# Patient Record
Sex: MALE | Race: WHITE | NOT HISPANIC OR LATINO | Employment: FULL TIME | URBAN - METROPOLITAN AREA
[De-identification: names, ages, dates, MRNs, and addresses within clinical notes are randomized per-mention and may not be internally consistent; named-entity substitution may affect disease eponyms.]

---

## 2017-06-06 PROBLEM — E11.65 UNCONTROLLED TYPE 2 DIABETES MELLITUS WITH HYPERGLYCEMIA, WITH LONG-TERM CURRENT USE OF INSULIN: Status: ACTIVE | Noted: 2017-06-06

## 2017-06-06 PROBLEM — Z79.4 UNCONTROLLED TYPE 2 DIABETES MELLITUS WITH HYPERGLYCEMIA, WITH LONG-TERM CURRENT USE OF INSULIN: Status: ACTIVE | Noted: 2017-06-06

## 2017-09-06 PROBLEM — Z79.4 UNCONTROLLED TYPE 2 DIABETES MELLITUS WITH HYPERGLYCEMIA, WITH LONG-TERM CURRENT USE OF INSULIN: Status: RESOLVED | Noted: 2017-06-06 | Resolved: 2017-09-06

## 2017-09-06 PROBLEM — E11.9 DIABETES MELLITUS WITHOUT COMPLICATION: Status: ACTIVE | Noted: 2017-09-06

## 2017-09-06 PROBLEM — E11.65 UNCONTROLLED TYPE 2 DIABETES MELLITUS WITH HYPERGLYCEMIA, WITH LONG-TERM CURRENT USE OF INSULIN: Status: RESOLVED | Noted: 2017-06-06 | Resolved: 2017-09-06

## 2017-11-28 PROBLEM — M19.012 OSTEOARTHRITIS OF LEFT SHOULDER: Status: ACTIVE | Noted: 2017-11-28

## 2018-09-28 PROBLEM — E11.9 DIABETES MELLITUS WITHOUT COMPLICATION: Status: RESOLVED | Noted: 2017-09-06 | Resolved: 2018-09-28

## 2019-11-19 PROBLEM — F51.01 PRIMARY INSOMNIA: Status: ACTIVE | Noted: 2019-11-19

## 2019-11-19 PROBLEM — I10 ESSENTIAL HYPERTENSION: Status: ACTIVE | Noted: 2019-11-19

## 2019-11-19 PROBLEM — E66.811 CLASS 1 OBESITY DUE TO EXCESS CALORIES WITH SERIOUS COMORBIDITY AND BODY MASS INDEX (BMI) OF 32.0 TO 32.9 IN ADULT: Status: ACTIVE | Noted: 2019-11-19

## 2019-11-19 PROBLEM — R76.8 HEPATITIS C ANTIBODY TEST POSITIVE: Status: ACTIVE | Noted: 2019-11-19

## 2019-11-19 PROBLEM — E66.09 CLASS 1 OBESITY DUE TO EXCESS CALORIES WITH SERIOUS COMORBIDITY AND BODY MASS INDEX (BMI) OF 32.0 TO 32.9 IN ADULT: Status: ACTIVE | Noted: 2019-11-19

## 2020-01-27 PROBLEM — J45.41 MODERATE PERSISTENT ASTHMA WITH ACUTE EXACERBATION: Status: ACTIVE | Noted: 2020-01-27

## 2022-12-14 PROBLEM — M54.12 CERVICAL RADICULOPATHY: Status: ACTIVE | Noted: 2022-12-14

## 2022-12-14 PROBLEM — E78.2 MIXED HYPERLIPIDEMIA: Status: ACTIVE | Noted: 2022-12-14

## 2022-12-21 ENCOUNTER — OFFICE VISIT (OUTPATIENT)
Dept: PAIN MEDICINE | Facility: CLINIC | Age: 67
End: 2022-12-21
Payer: COMMERCIAL

## 2022-12-21 VITALS
HEIGHT: 74 IN | HEART RATE: 87 BPM | BODY MASS INDEX: 32.36 KG/M2 | WEIGHT: 252.19 LBS | SYSTOLIC BLOOD PRESSURE: 158 MMHG | DIASTOLIC BLOOD PRESSURE: 86 MMHG

## 2022-12-21 DIAGNOSIS — M54.12 CERVICAL RADICULOPATHY: ICD-10-CM

## 2022-12-21 DIAGNOSIS — M54.2 CERVICALGIA: Primary | ICD-10-CM

## 2022-12-21 PROCEDURE — 99999 PR PBB SHADOW E&M-EST. PATIENT-LVL V: CPT | Mod: PBBFAC,,, | Performed by: PHYSICIAN ASSISTANT

## 2022-12-21 PROCEDURE — 1125F AMNT PAIN NOTED PAIN PRSNT: CPT | Mod: CPTII,S$GLB,, | Performed by: PHYSICIAN ASSISTANT

## 2022-12-21 PROCEDURE — 1101F PR PT FALLS ASSESS DOC 0-1 FALLS W/OUT INJ PAST YR: ICD-10-PCS | Mod: CPTII,S$GLB,, | Performed by: PHYSICIAN ASSISTANT

## 2022-12-21 PROCEDURE — 99205 OFFICE O/P NEW HI 60 MIN: CPT | Mod: S$GLB,,, | Performed by: PHYSICIAN ASSISTANT

## 2022-12-21 PROCEDURE — 3066F NEPHROPATHY DOC TX: CPT | Mod: CPTII,S$GLB,, | Performed by: PHYSICIAN ASSISTANT

## 2022-12-21 PROCEDURE — 1160F RVW MEDS BY RX/DR IN RCRD: CPT | Mod: CPTII,S$GLB,, | Performed by: PHYSICIAN ASSISTANT

## 2022-12-21 PROCEDURE — 3288F PR FALLS RISK ASSESSMENT DOCUMENTED: ICD-10-PCS | Mod: CPTII,S$GLB,, | Performed by: PHYSICIAN ASSISTANT

## 2022-12-21 PROCEDURE — 3008F BODY MASS INDEX DOCD: CPT | Mod: CPTII,S$GLB,, | Performed by: PHYSICIAN ASSISTANT

## 2022-12-21 PROCEDURE — 1101F PT FALLS ASSESS-DOCD LE1/YR: CPT | Mod: CPTII,S$GLB,, | Performed by: PHYSICIAN ASSISTANT

## 2022-12-21 PROCEDURE — 1159F PR MEDICATION LIST DOCUMENTED IN MEDICAL RECORD: ICD-10-PCS | Mod: CPTII,S$GLB,, | Performed by: PHYSICIAN ASSISTANT

## 2022-12-21 PROCEDURE — 1159F MED LIST DOCD IN RCRD: CPT | Mod: CPTII,S$GLB,, | Performed by: PHYSICIAN ASSISTANT

## 2022-12-21 PROCEDURE — 1125F PR PAIN SEVERITY QUANTIFIED, PAIN PRESENT: ICD-10-PCS | Mod: CPTII,S$GLB,, | Performed by: PHYSICIAN ASSISTANT

## 2022-12-21 PROCEDURE — 3046F HEMOGLOBIN A1C LEVEL >9.0%: CPT | Mod: CPTII,S$GLB,, | Performed by: PHYSICIAN ASSISTANT

## 2022-12-21 PROCEDURE — 3288F FALL RISK ASSESSMENT DOCD: CPT | Mod: CPTII,S$GLB,, | Performed by: PHYSICIAN ASSISTANT

## 2022-12-21 PROCEDURE — 3079F PR MOST RECENT DIASTOLIC BLOOD PRESSURE 80-89 MM HG: ICD-10-PCS | Mod: CPTII,S$GLB,, | Performed by: PHYSICIAN ASSISTANT

## 2022-12-21 PROCEDURE — 4010F ACE/ARB THERAPY RXD/TAKEN: CPT | Mod: CPTII,S$GLB,, | Performed by: PHYSICIAN ASSISTANT

## 2022-12-21 PROCEDURE — 99205 PR OFFICE/OUTPT VISIT, NEW, LEVL V, 60-74 MIN: ICD-10-PCS | Mod: S$GLB,,, | Performed by: PHYSICIAN ASSISTANT

## 2022-12-21 PROCEDURE — 1160F PR REVIEW ALL MEDS BY PRESCRIBER/CLIN PHARMACIST DOCUMENTED: ICD-10-PCS | Mod: CPTII,S$GLB,, | Performed by: PHYSICIAN ASSISTANT

## 2022-12-21 PROCEDURE — 3077F PR MOST RECENT SYSTOLIC BLOOD PRESSURE >= 140 MM HG: ICD-10-PCS | Mod: CPTII,S$GLB,, | Performed by: PHYSICIAN ASSISTANT

## 2022-12-21 PROCEDURE — 4010F PR ACE/ARB THEARPY RXD/TAKEN: ICD-10-PCS | Mod: CPTII,S$GLB,, | Performed by: PHYSICIAN ASSISTANT

## 2022-12-21 PROCEDURE — 3046F PR MOST RECENT HEMOGLOBIN A1C LEVEL > 9.0%: ICD-10-PCS | Mod: CPTII,S$GLB,, | Performed by: PHYSICIAN ASSISTANT

## 2022-12-21 PROCEDURE — 3060F POS MICROALBUMINURIA REV: CPT | Mod: CPTII,S$GLB,, | Performed by: PHYSICIAN ASSISTANT

## 2022-12-21 PROCEDURE — 3077F SYST BP >= 140 MM HG: CPT | Mod: CPTII,S$GLB,, | Performed by: PHYSICIAN ASSISTANT

## 2022-12-21 PROCEDURE — 3008F PR BODY MASS INDEX (BMI) DOCUMENTED: ICD-10-PCS | Mod: CPTII,S$GLB,, | Performed by: PHYSICIAN ASSISTANT

## 2022-12-21 PROCEDURE — 3066F PR DOCUMENTATION OF TREATMENT FOR NEPHROPATHY: ICD-10-PCS | Mod: CPTII,S$GLB,, | Performed by: PHYSICIAN ASSISTANT

## 2022-12-21 PROCEDURE — 3060F PR POS MICROALBUMINURIA RESULT DOCUMENTED/REVIEW: ICD-10-PCS | Mod: CPTII,S$GLB,, | Performed by: PHYSICIAN ASSISTANT

## 2022-12-21 PROCEDURE — 99999 PR PBB SHADOW E&M-EST. PATIENT-LVL V: ICD-10-PCS | Mod: PBBFAC,,, | Performed by: PHYSICIAN ASSISTANT

## 2022-12-21 PROCEDURE — 3079F DIAST BP 80-89 MM HG: CPT | Mod: CPTII,S$GLB,, | Performed by: PHYSICIAN ASSISTANT

## 2022-12-21 RX ORDER — TIZANIDINE 4 MG/1
4 TABLET ORAL NIGHTLY PRN
Qty: 40 TABLET | Refills: 0 | Status: SHIPPED | OUTPATIENT
Start: 2022-12-21 | End: 2023-01-17 | Stop reason: SDUPTHER

## 2022-12-22 NOTE — PROGRESS NOTES
[facility] New Patient Evaluation      Referred by: Dr. Erin Mccloud-*    PCP: Erin Alexis MD    CC:   Chief Complaint   Patient presents with    Neck Pain     Pain radiates down the left shoulder into the left arm and down to the tips of the fingers on the left hand .      No flowsheet data found.      HPI:   Noah Alan is a 66 y.o. male with history of diabetes, hyperlipidemia, hypertension, hepatitis C presents with left hand numbness and neck pain  he underwent 2018 left shoulder replacement with Dr. Martínez at [facility].  He did well after post op PT.  Some time after the surgery he developed numbness in the left hand.  He did further PT, but numbness did not resolve.  He has now also developed burning pain in the neck, left shoulder, arm and hand.   He has tried advil, but no other recent treatments.      Past and current medications:  Antineuropathics:  NSAIDs:  advil  Antidepressants:  Muscle relaxers:  Opioids:  Antiplatelets/Anticoagulants:    Physical therapy/ Chiropractic care:  For numbness in the left hand years ago; none recently    Pain Intervention History:  none    Past Spine Surgical History:  none      History:    Current Outpatient Medications:     albuterol sulfate (PROAIR RESPICLICK) 90 mcg/actuation inhaler, Inhale 2 puffs into the lungs 4 (four) times daily as needed for Wheezing. Rescue, Disp: 1 each, Rfl: 0    albuterol sulfate (PROAIR RESPICLICK) 90 mcg/actuation inhaler, Inhale 1-2 puffs into the lungs every 4 (four) hours as needed for Wheezing. Rescue, Disp: 1 each, Rfl: 2    albuterol sulfate (PROAIR RESPICLICK) 90 mcg/actuation inhaler, INHALE TWO PUFFS BY MOUTH FOUR TIMES A DAY AS NEEDED FOR WHEEZING, Disp: 1 each, Rfl: 2    blood sugar diagnostic (CONTOUR TEST STRIPS) Strp, 1 strip by Misc.(Non-Drug; Combo Route) route once daily., Disp: 100 each, Rfl: 3    budesonide-formoterol 160-4.5 mcg (SYMBICORT) 160-4.5 mcg/actuation HFAA, INHALE 2 PUFFS BY MOUTH TWICE  "DAILY, Disp: 10.2 g, Rfl: 3    dulaglutide (TRULICITY) 0.75 mg/0.5 mL pen injector, Inject 0.75 mg into the skin every 7 days., Disp: 12 pen, Rfl: 0    ibuprofen (ADVIL,MOTRIN) 800 MG tablet, Take 800 mg by mouth 3 (three) times daily., Disp: , Rfl:     insulin (LANTUS SOLOSTAR U-100 INSULIN) glargine 100 units/mL (3mL) SubQ pen, 50 Units subcutaneous at bedtime., Disp: 15 mL, Rfl: 1    insulin (LANTUS SOLOSTAR U-100 INSULIN) glargine 100 units/mL (3mL) SubQ pen, Inject 1 Units into the skin every evening. 50 Units qs at bedtime., Disp: 45 each, Rfl: 2    insulin (LANTUS SOLOSTAR U-100 INSULIN) glargine 100 units/mL SubQ pen, Inject 50 Units into the skin nightly., Disp: 45 mL, Rfl: 1    insulin aspart U-100 (NOVOLOG FLEXPEN U-100 INSULIN) 100 unit/mL (3 mL) InPn pen, Needs follow-up appt. Same sliding scale instructions, Disp: 15 each, Rfl: 0    ketoconazole (NIZORAL) 2 % cream, USE ONCE DAILY, Disp: 60 g, Rfl: 0    lisinopriL (PRINIVIL,ZESTRIL) 20 MG tablet, Take 1 tablet (20 mg total) by mouth once daily., Disp: 90 tablet, Rfl: 2    pen needle, diabetic (BD ULTRA-FINE JAELYN PEN NEEDLE) 32 gauge x 5/32" Ndle, Use as directed with Lantus and sliding scale regular insulin, Disp: 100 each, Rfl: 9    rosuvastatin (CRESTOR) 10 MG tablet, Take 1 tablet (10 mg total) by mouth once daily., Disp: 90 tablet, Rfl: 2    zolpidem (AMBIEN) 10 mg Tab, TAKE ONE TABLET BY MOUTH NIGHTLY AT BEDTIME AS NEEDED, Disp: 30 tablet, Rfl: 0    aspirin (ECOTRIN) 81 MG EC tablet, Take 1 tablet (81 mg total) by mouth once daily., Disp: , Rfl: 0    empagliflozin (JARDIANCE) 25 mg tablet, Take 1 tablet (25 mg total) by mouth once daily., Disp: 90 tablet, Rfl: 3    metFORMIN (GLUCOPHAGE) 1000 MG tablet, Take 1 tablet (1,000 mg total) by mouth 2 (two) times daily with meals., Disp: 180 tablet, Rfl: 3    tiZANidine (ZANAFLEX) 4 MG tablet, Take 1 tablet (4 mg total) by mouth nightly as needed (muscle spasms/ pain)., Disp: 40 tablet, Rfl: 0    Past " Medical History:   Diagnosis Date    Benign neoplasm     Chicken pox     Diabetes mellitus, type 2     ED (erectile dysfunction)     Hepatitis C virus infection without hepatic coma     Hyperlipidemia     Hypertension     Insomnia     LFTs abnormal     Uncontrolled diabetes mellitus        Past Surgical History:   Procedure Laterality Date    molar tooth surgery      MOLE REMOVAL      X3    ROOT CANAL      SHOULDER SURGERY Left        Family History   Problem Relation Age of Onset    Stroke Father     Cancer Mother         lymphoma    No Known Problems Brother     No Known Problems Daughter     No Known Problems Son     No Known Problems Son        Social History     Socioeconomic History    Marital status:    Tobacco Use    Smoking status: Never    Smokeless tobacco: Never   Substance and Sexual Activity    Alcohol use: Yes     Alcohol/week: 2.0 standard drinks     Types: 2 Glasses of wine per week    Drug use: No    Sexual activity: Not Currently     Social Determinants of Health     Financial Resource Strain: Low Risk     Difficulty of Paying Living Expenses: Not hard at all   Food Insecurity: No Food Insecurity    Worried About Running Out of Food in the Last Year: Never true    Ran Out of Food in the Last Year: Never true   Transportation Needs: No Transportation Needs    Lack of Transportation (Medical): No    Lack of Transportation (Non-Medical): No   Physical Activity: Insufficiently Active    Days of Exercise per Week: 1 day    Minutes of Exercise per Session: 10 min   Stress: Stress Concern Present    Feeling of Stress : Very much   Social Connections: Unknown    Frequency of Communication with Friends and Family: More than three times a week    Frequency of Social Gatherings with Friends and Family: More than three times a week    Active Member of Clubs or Organizations: No    Attends Club or Organization Meetings: 1 to 4 times per year    Marital Status:    Housing Stability: Low Risk      "Unable to Pay for Housing in the Last Year: No    Number of Places Lived in the Last Year: 1    Unstable Housing in the Last Year: No       Review of patient's allergies indicates:   Allergen Reactions    Hay fever and allergy relief        Review of Systems:  Neck pain, arm pain, hand numbness.  Balance of review of systems is negative.    Physical Exam:  Vitals:    12/21/22 0902   BP: (!) 158/86   Pulse: 87   Weight: 114.4 kg (252 lb 3.3 oz)   Height: 6' 2" (1.88 m)   PainSc:   7   PainLoc: Neck     Body mass index is 32.38 kg/m².    Gen: NAD  Psych: mood appropriate for given condition  HEENT: eyes anicteric   CV: RRR, 2+ radial pulse  HEENT: anicteric   Respiratory: non-labored, no signs of respiratory distress  Abd: non-distended  Skin: warm, dry and intact.  Gait: Able to heel walk, toe walk. No antalgic gait.     Coordination:   Romberg: negative  Finger to nose coordination: normal  Heel to shin coordination: normal  Tandem walking coordination: normal    Cervical spine:   ROM is full in flexion, extension and lateral rotation without increased pain.  Spurling's maneuver causes no neck pain to either side.  Myofascial exam: No Tenderness to palpation across cervical paraspinous region bilaterally.    Lumbar spine:   ROM is full with flexion extension and oblique extension with no increased pain.    Andrew's test causes no increased pain on either side.    Supine straight leg raise is negative bilaterally.    Internal and external rotation of the hip causes no increased pain on either side.  Myofascial exam: No tenderness to palpation across lumbar paraspinous muscles. No tenderness to palpation over the bilateral greater trochanters and bilateral SI joint    Sensory:  Intact and symmetrical to light touch in C4-T1 dermatomes bilaterally. Intact and symmetrical to light touch in L1-S1 dermatomes bilaterally.    Motor:    Right Left   C4 Shoulder Abduction  5  5   C5 Elbow Flexion    5  5   C6 Wrist Extension "  5  5   C7 Elbow Extension   5  5   C8/T1 Hand Intrinsics   5  5        Right Left   L2/3 Iliacus Hip flexion  5  5   L3/4 Qudratus Femoris Knee Extension  5  5   L4/5 Tib Anterior Ankle Dorsiflexion   5  5   L5/S1 Extensor Hallicus Longus Great toe extension  5  5   S1/S2 Gastroc/Soleus Plantar Flexion  5  5      Right Left   Triceps DTR 2+ 2+   Biceps DTR 2+ 2+   Brachioradialis DTR 2+ 2+   Patellar DTR 2+ 2+   Achilles DTR 2+ 2+   Blair Absent  Absent   Clonus Absent Absent   Babinski Absent Absent     Imaging:  No spine imaging    Labs:  Lab Results   Component Value Date    HGBA1C 11.4 (H) 12/13/2022       Lab Results   Component Value Date    WBC 8.22 11/18/2019    HGB 14.9 11/18/2019    HCT 44.3 11/18/2019    MCV 85 11/18/2019     11/18/2019           Assessment:     Mr. Zamora has chronic neck pain with left arm radiculoapthy - likely left C6, C7 radiculopathy.  No recent treatments.  Will try PT.  I am prescribing zanaflex to help with pain as well.  He is not an ideal candidate for INNA at this time given very elevated HgbA1C.  He did recnetly change his diabetic medications. Plan to see back in 3 months during which time he can work with PT to control pain and get blood sugars better controlled.  Can consider further treatment and possibly interventional procedures if he still has pain at a follow up in 3 months.  He is agreeable to plan.    The total time spent for evaluation and management on 12/21/2022 including reviewing separately obtained history, performing a medically appropriate exam and evaluation, documenting clinical information in the health record, independently interpreting results and communicating them to the patient/family/caregiver, and ordering medications/tests/procedures was between 60-74 minutes.        Problem List Items Addressed This Visit       Cervical radiculopathy    Relevant Medications    tiZANidine (ZANAFLEX) 4 MG tablet    Other Relevant Orders    Ambulatory  referral/consult to Physical/Occupational Therapy     Other Visit Diagnoses       Cervicalgia    -  Primary    Relevant Medications    tiZANidine (ZANAFLEX) 4 MG tablet    Other Relevant Orders    Ambulatory referral/consult to Physical/Occupational Therapy              Follow Up: RTC 3 months.    : Not applicable          Jayne Swain PA-C  [facility]      This note was completed with dictation software and grammatical errors may exist.

## 2023-01-17 DIAGNOSIS — M54.12 CERVICAL RADICULOPATHY: ICD-10-CM

## 2023-01-17 DIAGNOSIS — M54.2 CERVICALGIA: ICD-10-CM

## 2023-01-17 RX ORDER — TIZANIDINE 4 MG/1
4 TABLET ORAL NIGHTLY PRN
Qty: 40 TABLET | Refills: 0 | Status: SHIPPED | OUTPATIENT
Start: 2023-01-17 | End: 2023-02-08 | Stop reason: SDUPTHER

## 2023-02-08 DIAGNOSIS — M54.12 CERVICAL RADICULOPATHY: ICD-10-CM

## 2023-02-08 DIAGNOSIS — M54.2 CERVICALGIA: ICD-10-CM

## 2023-02-08 RX ORDER — TIZANIDINE 4 MG/1
4 TABLET ORAL NIGHTLY PRN
Qty: 40 TABLET | Refills: 0 | Status: SHIPPED | OUTPATIENT
Start: 2023-02-08 | End: 2023-03-06 | Stop reason: SDUPTHER

## 2023-03-06 DIAGNOSIS — M54.2 CERVICALGIA: ICD-10-CM

## 2023-03-06 DIAGNOSIS — M54.12 CERVICAL RADICULOPATHY: ICD-10-CM

## 2023-03-06 RX ORDER — TIZANIDINE 4 MG/1
4 TABLET ORAL NIGHTLY PRN
Qty: 40 TABLET | Refills: 0 | Status: SHIPPED | OUTPATIENT
Start: 2023-03-06 | End: 2023-03-18 | Stop reason: SDUPTHER

## 2023-03-10 ENCOUNTER — OFFICE VISIT (OUTPATIENT)
Dept: PAIN MEDICINE | Facility: CLINIC | Age: 68
End: 2023-03-10
Payer: COMMERCIAL

## 2023-03-10 VITALS
HEIGHT: 74 IN | WEIGHT: 247 LBS | SYSTOLIC BLOOD PRESSURE: 128 MMHG | HEART RATE: 81 BPM | BODY MASS INDEX: 31.7 KG/M2 | DIASTOLIC BLOOD PRESSURE: 79 MMHG

## 2023-03-10 DIAGNOSIS — M54.2 CERVICALGIA: ICD-10-CM

## 2023-03-10 DIAGNOSIS — M54.12 CERVICAL RADICULOPATHY: Primary | ICD-10-CM

## 2023-03-10 PROCEDURE — 99214 OFFICE O/P EST MOD 30 MIN: CPT | Mod: S$GLB,,, | Performed by: PHYSICIAN ASSISTANT

## 2023-03-10 PROCEDURE — 99214 PR OFFICE/OUTPT VISIT, EST, LEVL IV, 30-39 MIN: ICD-10-PCS | Mod: S$GLB,,, | Performed by: PHYSICIAN ASSISTANT

## 2023-03-10 PROCEDURE — 4010F PR ACE/ARB THEARPY RXD/TAKEN: ICD-10-PCS | Mod: CPTII,S$GLB,, | Performed by: PHYSICIAN ASSISTANT

## 2023-03-10 PROCEDURE — 3288F FALL RISK ASSESSMENT DOCD: CPT | Mod: CPTII,S$GLB,, | Performed by: PHYSICIAN ASSISTANT

## 2023-03-10 PROCEDURE — 99999 PR PBB SHADOW E&M-EST. PATIENT-LVL IV: ICD-10-PCS | Mod: PBBFAC,,, | Performed by: PHYSICIAN ASSISTANT

## 2023-03-10 PROCEDURE — 3288F PR FALLS RISK ASSESSMENT DOCUMENTED: ICD-10-PCS | Mod: CPTII,S$GLB,, | Performed by: PHYSICIAN ASSISTANT

## 2023-03-10 PROCEDURE — 3074F PR MOST RECENT SYSTOLIC BLOOD PRESSURE < 130 MM HG: ICD-10-PCS | Mod: CPTII,S$GLB,, | Performed by: PHYSICIAN ASSISTANT

## 2023-03-10 PROCEDURE — 99999 PR PBB SHADOW E&M-EST. PATIENT-LVL IV: CPT | Mod: PBBFAC,,, | Performed by: PHYSICIAN ASSISTANT

## 2023-03-10 PROCEDURE — 4010F ACE/ARB THERAPY RXD/TAKEN: CPT | Mod: CPTII,S$GLB,, | Performed by: PHYSICIAN ASSISTANT

## 2023-03-10 PROCEDURE — 1125F PR PAIN SEVERITY QUANTIFIED, PAIN PRESENT: ICD-10-PCS | Mod: CPTII,S$GLB,, | Performed by: PHYSICIAN ASSISTANT

## 2023-03-10 PROCEDURE — 1160F RVW MEDS BY RX/DR IN RCRD: CPT | Mod: CPTII,S$GLB,, | Performed by: PHYSICIAN ASSISTANT

## 2023-03-10 PROCEDURE — 3008F PR BODY MASS INDEX (BMI) DOCUMENTED: ICD-10-PCS | Mod: CPTII,S$GLB,, | Performed by: PHYSICIAN ASSISTANT

## 2023-03-10 PROCEDURE — 3008F BODY MASS INDEX DOCD: CPT | Mod: CPTII,S$GLB,, | Performed by: PHYSICIAN ASSISTANT

## 2023-03-10 PROCEDURE — 3078F PR MOST RECENT DIASTOLIC BLOOD PRESSURE < 80 MM HG: ICD-10-PCS | Mod: CPTII,S$GLB,, | Performed by: PHYSICIAN ASSISTANT

## 2023-03-10 PROCEDURE — 1160F PR REVIEW ALL MEDS BY PRESCRIBER/CLIN PHARMACIST DOCUMENTED: ICD-10-PCS | Mod: CPTII,S$GLB,, | Performed by: PHYSICIAN ASSISTANT

## 2023-03-10 PROCEDURE — 1101F PT FALLS ASSESS-DOCD LE1/YR: CPT | Mod: CPTII,S$GLB,, | Performed by: PHYSICIAN ASSISTANT

## 2023-03-10 PROCEDURE — 3074F SYST BP LT 130 MM HG: CPT | Mod: CPTII,S$GLB,, | Performed by: PHYSICIAN ASSISTANT

## 2023-03-10 PROCEDURE — 1125F AMNT PAIN NOTED PAIN PRSNT: CPT | Mod: CPTII,S$GLB,, | Performed by: PHYSICIAN ASSISTANT

## 2023-03-10 PROCEDURE — 1159F PR MEDICATION LIST DOCUMENTED IN MEDICAL RECORD: ICD-10-PCS | Mod: CPTII,S$GLB,, | Performed by: PHYSICIAN ASSISTANT

## 2023-03-10 PROCEDURE — 1159F MED LIST DOCD IN RCRD: CPT | Mod: CPTII,S$GLB,, | Performed by: PHYSICIAN ASSISTANT

## 2023-03-10 PROCEDURE — 3078F DIAST BP <80 MM HG: CPT | Mod: CPTII,S$GLB,, | Performed by: PHYSICIAN ASSISTANT

## 2023-03-10 PROCEDURE — 1101F PR PT FALLS ASSESS DOC 0-1 FALLS W/OUT INJ PAST YR: ICD-10-PCS | Mod: CPTII,S$GLB,, | Performed by: PHYSICIAN ASSISTANT

## 2023-03-10 RX ORDER — LORATADINE 10 MG/1
10 TABLET ORAL DAILY PRN
COMMUNITY
End: 2023-03-14 | Stop reason: SDUPTHER

## 2023-03-10 RX ORDER — FLUTICASONE PROPIONATE 50 MCG
2 SPRAY, SUSPENSION (ML) NASAL
COMMUNITY
End: 2023-03-14 | Stop reason: SDUPTHER

## 2023-03-10 NOTE — PROGRESS NOTES
[facility] Established Patient        PCP: Erin Alexis MD    CC:   Chief Complaint   Patient presents with    Neck Pain     3 month f/u for neck pain, comes and goes now.      No flowsheet data found.      HPI:     Mr. Zamora presents for for follow up of neck and left arm radicular symptoms.  He was referred to PT last visit, but did not start.  He has been doing home exericse and home traction on his own.  He has not had any major change.  Zanaflex does help some temporarily.  Continues with intermittent burning pain in the neck, left shoulder, arm and hand.    Initial HPI:  Noah Alan is a 67 y.o. male with history of diabetes, hyperlipidemia, hypertension, hepatitis C presents with left hand numbness and neck pain  he underwent 2018 left shoulder replacement with Dr. Martínez at [facility].  He did well after post op PT.  Some time after the surgery he developed numbness in the left hand.  He did further PT, but numbness did not resolve.  He has now also developed burning pain in the neck, left shoulder, arm and hand.   He has tried advil, but no other recent treatments.      Past and current medications:  Antineuropathics:  NSAIDs:  advil  Antidepressants:  Muscle relaxers:  zanaflex  Opioids:  Antiplatelets/Anticoagulants:    Physical therapy/ Chiropractic care:  For numbness in the left hand years ago; none recently    Pain Intervention History:  none    Past Spine Surgical History:  none      History:    Current Outpatient Medications:     albuterol sulfate (PROAIR RESPICLICK) 90 mcg/actuation inhaler, Inhale 2 puffs into the lungs 4 (four) times daily as needed for Wheezing. Rescue, Disp: 1 each, Rfl: 0    albuterol sulfate (PROAIR RESPICLICK) 90 mcg/actuation inhaler, Inhale 1-2 puffs into the lungs every 4 (four) hours as needed for Wheezing. Rescue, Disp: 1 each, Rfl: 2    albuterol sulfate (PROAIR RESPICLICK) 90 mcg/actuation inhaler, Inhale 1 puff into the lungs every 4 (four) hours as  "needed for Wheezing. Rescue, Disp: 1 each, Rfl: 3    blood sugar diagnostic (CONTOUR TEST STRIPS) Strp, 1 strip by Misc.(Non-Drug; Combo Route) route once daily., Disp: 100 each, Rfl: 3    budesonide-formoterol 160-4.5 mcg (SYMBICORT) 160-4.5 mcg/actuation HFAA, INHALE 2 PUFFS BY MOUTH TWICE DAILY, Disp: 10.2 g, Rfl: 3    dulaglutide (TRULICITY) 0.75 mg/0.5 mL pen injector, Inject 0.75 mg into the skin every 7 days., Disp: 12 pen, Rfl: 0    empagliflozin (JARDIANCE) 25 mg tablet, Take 1 tablet (25 mg total) by mouth once daily., Disp: 90 tablet, Rfl: 3    fluticasone propionate (FLONASE) 50 mcg/actuation nasal spray, 2 sprays by Each Nostril route as needed for Rhinitis., Disp: , Rfl:     ibuprofen (ADVIL,MOTRIN) 800 MG tablet, Take 800 mg by mouth 3 (three) times daily., Disp: , Rfl:     insulin (LANTUS SOLOSTAR U-100 INSULIN) glargine 100 units/mL (3mL) SubQ pen, 50 Units subcutaneous at bedtime., Disp: 15 mL, Rfl: 1    insulin (LANTUS SOLOSTAR U-100 INSULIN) glargine 100 units/mL (3mL) SubQ pen, Inject 1 Units into the skin every evening. 50 Units qs at bedtime., Disp: 45 each, Rfl: 2    insulin (LANTUS SOLOSTAR U-100 INSULIN) glargine 100 units/mL SubQ pen, Inject 50 Units into the skin nightly., Disp: 45 mL, Rfl: 1    insulin aspart U-100 (NOVOLOG FLEXPEN U-100 INSULIN) 100 unit/mL (3 mL) InPn pen, Needs follow-up appt. Same sliding scale instructions, Disp: 15 each, Rfl: 0    ketoconazole (NIZORAL) 2 % cream, USE ONCE DAILY, Disp: 60 g, Rfl: 0    lisinopriL (PRINIVIL,ZESTRIL) 20 MG tablet, Take 1 tablet (20 mg total) by mouth once daily., Disp: 90 tablet, Rfl: 2    loratadine (CLARITIN) 10 mg tablet, Take 10 mg by mouth daily as needed for Allergies., Disp: , Rfl:     metFORMIN (GLUCOPHAGE) 1000 MG tablet, Take 1 tablet (1,000 mg total) by mouth 2 (two) times daily with meals., Disp: 180 tablet, Rfl: 3    pen needle, diabetic (BD ULTRA-FINE JAELYN PEN NEEDLE) 32 gauge x 5/32" Ndle, Use as directed with Lantus " and sliding scale regular insulin, Disp: 100 each, Rfl: 9    PROAIR RESPICLICK 90 mcg/actuation inhaler, INHALE TWO PUFFS BY MOUTH FOUR TIMES A DAY AS NEEDED FOR WHEEZING, Disp: 1 each, Rfl: 0    rosuvastatin (CRESTOR) 10 MG tablet, Take 1 tablet (10 mg total) by mouth once daily., Disp: 90 tablet, Rfl: 2    tiZANidine (ZANAFLEX) 4 MG tablet, Take 1 tablet (4 mg total) by mouth nightly as needed (muscle spasms/ pain)., Disp: 40 tablet, Rfl: 0    zolpidem (AMBIEN) 10 mg Tab, Take 1 tablet (10 mg total) by mouth nightly as needed (insomnia)., Disp: 30 tablet, Rfl: 3    aspirin (ECOTRIN) 81 MG EC tablet, Take 1 tablet (81 mg total) by mouth once daily., Disp: , Rfl: 0    Past Medical History:   Diagnosis Date    Benign neoplasm     Chicken pox     Diabetes mellitus, type 2     ED (erectile dysfunction)     Hepatitis C virus infection without hepatic coma     Hyperlipidemia     Hypertension     Insomnia     LFTs abnormal     Uncontrolled diabetes mellitus        Past Surgical History:   Procedure Laterality Date    molar tooth surgery      MOLE REMOVAL      X3    ROOT CANAL      SHOULDER SURGERY Left        Family History   Problem Relation Age of Onset    Stroke Father     Cancer Mother         lymphoma    No Known Problems Brother     No Known Problems Daughter     No Known Problems Son     No Known Problems Son        Social History     Socioeconomic History    Marital status:    Tobacco Use    Smoking status: Never    Smokeless tobacco: Never   Substance and Sexual Activity    Alcohol use: Yes     Alcohol/week: 2.0 standard drinks     Types: 2 Glasses of wine per week    Drug use: No    Sexual activity: Not Currently     Social Determinants of Health     Financial Resource Strain: Low Risk     Difficulty of Paying Living Expenses: Not hard at all   Food Insecurity: No Food Insecurity    Worried About Running Out of Food in the Last Year: Never true    Ran Out of Food in the Last Year: Never true  "  Transportation Needs: No Transportation Needs    Lack of Transportation (Medical): No    Lack of Transportation (Non-Medical): No   Physical Activity: Insufficiently Active    Days of Exercise per Week: 2 days    Minutes of Exercise per Session: 60 min   Stress: Stress Concern Present    Feeling of Stress : Rather much   Social Connections: Unknown    Frequency of Communication with Friends and Family: Three times a week    Frequency of Social Gatherings with Friends and Family: Once a week    Active Member of Clubs or Organizations: Yes    Attends Club or Organization Meetings: 1 to 4 times per year    Marital Status:    Housing Stability: Low Risk     Unable to Pay for Housing in the Last Year: No    Number of Places Lived in the Last Year: 1    Unstable Housing in the Last Year: No       Review of patient's allergies indicates:   Allergen Reactions    Hay fever and allergy relief        Review of Systems:  Neck pain, arm pain, hand numbness.  Balance of review of systems is negative.    Physical Exam:  Vitals:    03/10/23 0832   BP: 128/79   Pulse: 81   Weight: 112.1 kg (247 lb 0.4 oz)   Height: 6' 2" (1.88 m)   PainSc:   4   PainLoc: Neck     Body mass index is 31.72 kg/m².    Gen: NAD  Psych: mood appropriate for given condition  HEENT: eyes anicteric   CV: RRR, 2+ radial pulse  HEENT: anicteric   Respiratory: non-labored, no signs of respiratory distress  Abd: non-distended  Skin: warm, dry and intact.  Gait: Able to heel walk, toe walk. No antalgic gait.     Coordination:   Romberg: negative  Finger to nose coordination: normal  Heel to shin coordination: normal  Tandem walking coordination: normal    Cervical spine:   ROM is full in flexion, extension and lateral rotation without increased pain.  Spurling's maneuver causes no neck pain to either side.  Myofascial exam: No Tenderness to palpation across cervical paraspinous region bilaterally.    Lumbar spine:   ROM is full with flexion extension and " oblique extension with no increased pain.    Andrew's test causes no increased pain on either side.    Supine straight leg raise is negative bilaterally.    Internal and external rotation of the hip causes no increased pain on either side.  Myofascial exam: No tenderness to palpation across lumbar paraspinous muscles. No tenderness to palpation over the bilateral greater trochanters and bilateral SI joint    Sensory:  Intact and symmetrical to light touch in C4-T1 dermatomes bilaterally. Intact and symmetrical to light touch in L1-S1 dermatomes bilaterally.    Motor:    Right Left   C4 Shoulder Abduction  5  5   C5 Elbow Flexion    5  5   C6 Wrist Extension  5  5   C7 Elbow Extension   5  5   C8/T1 Hand Intrinsics   5  5        Right Left   L2/3 Iliacus Hip flexion  5  5   L3/4 Qudratus Femoris Knee Extension  5  5   L4/5 Tib Anterior Ankle Dorsiflexion   5  5   L5/S1 Extensor Hallicus Longus Great toe extension  5  5   S1/S2 Gastroc/Soleus Plantar Flexion  5  5      Right Left   Triceps DTR 2+ 2+   Biceps DTR 2+ 2+   Brachioradialis DTR 2+ 2+   Patellar DTR 2+ 2+   Achilles DTR 2+ 2+   Blair Absent  Absent   Clonus Absent Absent   Babinski Absent Absent     Imaging:  No spine imaging    Labs:  Lab Results   Component Value Date    HGBA1C 11.4 (H) 12/13/2022       Lab Results   Component Value Date    WBC 8.22 11/18/2019    HGB 14.9 11/18/2019    HCT 44.3 11/18/2019    MCV 85 11/18/2019     11/18/2019           Assessment:     Mr. Zamora has chronic neck pain with left arm radiculoapthy - likely left C6, C7 radiculopathy.  No change with home exercise ant traction.  Will cotninue with home treatments at this time.  Call when/ if he is ready to start PT.  Discussed INNA again.  With past HgbA1C levells, he was not an ideal candidate for INNA.  He has diabetes check up neck week.  If blood glucose levels are better controlled and ok with Dr. Alexis may obtain MRi and concsider INNA.  Will call hen ready to  proceed.  Follow up as needed.      The total time spent for evaluation and management on 03/10/2023 including reviewing separately obtained history, performing a medically appropriate exam and evaluation, documenting clinical information in the health record, independently interpreting results and communicating them to the patient/family/caregiver, and ordering medications/tests/procedures was between 30-39 minutes.        Problem List Items Addressed This Visit       Cervical radiculopathy - Primary     Other Visit Diagnoses       Cervicalgia                    Follow Up: RTC 3 months.    : Not applicable          Jayne Swain PA-C  [facility]      This note was completed with dictation software and grammatical errors may exist.

## 2023-03-14 PROBLEM — J30.1 SEASONAL ALLERGIC RHINITIS DUE TO POLLEN: Status: ACTIVE | Noted: 2023-03-14

## 2023-03-18 DIAGNOSIS — M54.2 CERVICALGIA: ICD-10-CM

## 2023-03-18 DIAGNOSIS — M54.12 CERVICAL RADICULOPATHY: ICD-10-CM

## 2023-03-21 RX ORDER — TIZANIDINE 4 MG/1
4 TABLET ORAL NIGHTLY PRN
Qty: 40 TABLET | Refills: 0 | Status: SHIPPED | OUTPATIENT
Start: 2023-03-21 | End: 2023-04-27 | Stop reason: SDUPTHER

## 2023-04-18 ENCOUNTER — TELEPHONE (OUTPATIENT)
Dept: PAIN MEDICINE | Facility: CLINIC | Age: 68
End: 2023-04-18
Payer: COMMERCIAL

## 2023-04-18 NOTE — TELEPHONE ENCOUNTER
----- Message from Veronica Hanson sent at 4/18/2023  1:03 PM CDT -----  Regarding: advise  Contact: patient  Type: Needs Medical Advice  Who Called:  Patient  Symptoms (please be specific):  fell on boat/ extreme back pain  How long has patient had these symptoms:   Pharmacy name and phone #:    Best Call Back Number: [phone]    Additional Information: Please call pt to advise on how he can treat it. Due to being out of town.  Thanks!

## 2023-04-18 NOTE — TELEPHONE ENCOUNTER
I spoke with Mr. Alan and he said he fell on his boat and landed on an ice chest and now has back pain. He is using Advil and Tylenol, along with ice and the pain has gotten a little bit better. He would like Jayne Swain to give him something stronger for pain. He is out of town and I let him know Jayne could not give him any medication without seeing him first. He indicated understanding and may try to go to an urgent care to see if they can help him. When he gets back in town I said I would be happy to schedule him an appointment with Jayne, and again he indicated understanding.

## 2023-04-27 DIAGNOSIS — M54.2 CERVICALGIA: ICD-10-CM

## 2023-04-27 DIAGNOSIS — M54.12 CERVICAL RADICULOPATHY: ICD-10-CM

## 2023-04-27 RX ORDER — TIZANIDINE 4 MG/1
4 TABLET ORAL NIGHTLY PRN
Qty: 40 TABLET | Refills: 0 | Status: SHIPPED | OUTPATIENT
Start: 2023-04-27 | End: 2023-05-16 | Stop reason: SDUPTHER

## 2023-05-16 ENCOUNTER — PATIENT MESSAGE (OUTPATIENT)
Dept: PAIN MEDICINE | Facility: CLINIC | Age: 68
End: 2023-05-16
Payer: COMMERCIAL

## 2023-05-16 DIAGNOSIS — M54.12 CERVICAL RADICULOPATHY: ICD-10-CM

## 2023-05-16 DIAGNOSIS — M54.2 CERVICALGIA: ICD-10-CM

## 2023-05-16 RX ORDER — TIZANIDINE 4 MG/1
4 TABLET ORAL NIGHTLY PRN
Qty: 40 TABLET | Refills: 0 | Status: SHIPPED | OUTPATIENT
Start: 2023-05-16 | End: 2023-06-17 | Stop reason: SDUPTHER

## 2023-06-17 DIAGNOSIS — M54.2 CERVICALGIA: ICD-10-CM

## 2023-06-17 DIAGNOSIS — M54.12 CERVICAL RADICULOPATHY: ICD-10-CM

## 2023-06-19 RX ORDER — TIZANIDINE 4 MG/1
4 TABLET ORAL NIGHTLY PRN
Qty: 30 TABLET | Refills: 0 | Status: SHIPPED | OUTPATIENT
Start: 2023-06-19 | End: 2023-07-17 | Stop reason: SDUPTHER

## 2023-06-19 NOTE — TELEPHONE ENCOUNTER
I did refiill zanaflex.  If still requiring medications to control pain, I strongly recommend PT.  No further refills until seen in clinic.  Last seen 3 months ago.

## 2023-06-20 NOTE — TELEPHONE ENCOUNTER
I called to notify Mr. Alan that Jayne Swain did refill the Tizanidine, someone answered the phone and hung up. I could not leave a message.

## 2023-07-17 DIAGNOSIS — M54.12 CERVICAL RADICULOPATHY: ICD-10-CM

## 2023-07-17 DIAGNOSIS — M54.2 CERVICALGIA: ICD-10-CM

## 2023-07-17 RX ORDER — TIZANIDINE 4 MG/1
4 TABLET ORAL NIGHTLY PRN
Qty: 30 TABLET | Refills: 0 | Status: SHIPPED | OUTPATIENT
Start: 2023-07-17 | End: 2024-01-10 | Stop reason: SDUPTHER

## 2023-07-17 NOTE — TELEPHONE ENCOUNTER
Last seen 3-10-23.    I refilled zanalfex, but further refills should come from pcp or he will need to follow up in clinic.

## 2023-07-17 NOTE — TELEPHONE ENCOUNTER
I spoke with Mr. Alan and notified him that Jayne Swain refilled the Tizanidine, and he will contact Dr. Alexis for any further refills.

## 2024-04-18 PROBLEM — Z79.4 TYPE 2 DIABETES MELLITUS WITH MICROALBUMINURIA, WITH LONG-TERM CURRENT USE OF INSULIN: Status: ACTIVE | Noted: 2024-04-18

## 2024-04-18 PROBLEM — R80.9 TYPE 2 DIABETES MELLITUS WITH MICROALBUMINURIA, WITH LONG-TERM CURRENT USE OF INSULIN: Status: ACTIVE | Noted: 2024-04-18

## 2024-04-18 PROBLEM — E11.29 TYPE 2 DIABETES MELLITUS WITH MICROALBUMINURIA, WITH LONG-TERM CURRENT USE OF INSULIN: Status: ACTIVE | Noted: 2024-04-18

## 2024-06-26 ENCOUNTER — TELEPHONE (OUTPATIENT)
Dept: NEUROSURGERY | Facility: CLINIC | Age: 69
End: 2024-06-26
Payer: COMMERCIAL

## 2024-06-26 NOTE — TELEPHONE ENCOUNTER
Called patient in regards to referral placed to Neurosurgery/José Miguel George MD. Current imaging in chart. No answer. LVM to call [phone] for appointment scheduling

## 2024-07-11 ENCOUNTER — OFFICE VISIT (OUTPATIENT)
Dept: NEUROSURGERY | Facility: CLINIC | Age: 69
End: 2024-07-11
Payer: COMMERCIAL

## 2024-07-11 VITALS
RESPIRATION RATE: 18 BRPM | HEIGHT: 74 IN | WEIGHT: 250.88 LBS | DIASTOLIC BLOOD PRESSURE: 80 MMHG | HEART RATE: 87 BPM | SYSTOLIC BLOOD PRESSURE: 148 MMHG | BODY MASS INDEX: 32.2 KG/M2

## 2024-07-11 DIAGNOSIS — M50.30 DDD (DEGENERATIVE DISC DISEASE), CERVICAL: Primary | ICD-10-CM

## 2024-07-11 DIAGNOSIS — M54.12 CERVICAL RADICULOPATHY AT C6: ICD-10-CM

## 2024-07-11 PROCEDURE — 1159F MED LIST DOCD IN RCRD: CPT | Mod: CPTII,S$GLB,, | Performed by: STUDENT IN AN ORGANIZED HEALTH CARE EDUCATION/TRAINING PROGRAM

## 2024-07-11 PROCEDURE — 3008F BODY MASS INDEX DOCD: CPT | Mod: CPTII,S$GLB,, | Performed by: STUDENT IN AN ORGANIZED HEALTH CARE EDUCATION/TRAINING PROGRAM

## 2024-07-11 PROCEDURE — 3079F DIAST BP 80-89 MM HG: CPT | Mod: CPTII,S$GLB,, | Performed by: STUDENT IN AN ORGANIZED HEALTH CARE EDUCATION/TRAINING PROGRAM

## 2024-07-11 PROCEDURE — 4010F ACE/ARB THERAPY RXD/TAKEN: CPT | Mod: CPTII,S$GLB,, | Performed by: STUDENT IN AN ORGANIZED HEALTH CARE EDUCATION/TRAINING PROGRAM

## 2024-07-11 PROCEDURE — 3051F HG A1C>EQUAL 7.0%<8.0%: CPT | Mod: CPTII,S$GLB,, | Performed by: STUDENT IN AN ORGANIZED HEALTH CARE EDUCATION/TRAINING PROGRAM

## 2024-07-11 PROCEDURE — 3077F SYST BP >= 140 MM HG: CPT | Mod: CPTII,S$GLB,, | Performed by: STUDENT IN AN ORGANIZED HEALTH CARE EDUCATION/TRAINING PROGRAM

## 2024-07-11 PROCEDURE — 99204 OFFICE O/P NEW MOD 45 MIN: CPT | Mod: S$GLB,,, | Performed by: STUDENT IN AN ORGANIZED HEALTH CARE EDUCATION/TRAINING PROGRAM

## 2024-07-11 PROCEDURE — 3288F FALL RISK ASSESSMENT DOCD: CPT | Mod: CPTII,S$GLB,, | Performed by: STUDENT IN AN ORGANIZED HEALTH CARE EDUCATION/TRAINING PROGRAM

## 2024-07-11 PROCEDURE — 1101F PT FALLS ASSESS-DOCD LE1/YR: CPT | Mod: CPTII,S$GLB,, | Performed by: STUDENT IN AN ORGANIZED HEALTH CARE EDUCATION/TRAINING PROGRAM

## 2024-07-11 PROCEDURE — 1125F AMNT PAIN NOTED PAIN PRSNT: CPT | Mod: CPTII,S$GLB,, | Performed by: STUDENT IN AN ORGANIZED HEALTH CARE EDUCATION/TRAINING PROGRAM

## 2024-07-11 NOTE — PROGRESS NOTES
[place] - Neurosurgery - [facility]  Clinic Consult     Consult Requested By: Ibrahima Vu II, *  PCP: Erin Booker MD    SUBJECTIVE:     Chief Complaint:   Chief Complaint   Patient presents with    Cervical Spine Pain (C-spine)       History of Present Illness:  Noah Alan is a 68 y.o. male who presents for evaluation of neck stiffness and shoulder pain. Patient reports onset on right shoulder pain 1 year ago. He has a history of left shoulder replacement and believed his right shoulder would require that. He saw ortho who has referred him to PT for the shoulder. He completed MRI cervical spine due to possible neck involvement. He was referred to our office. He has not attended PT for his neck. He has not received injections.     Pertinent and recent history, provider evaluations, imaging and data reviewed in EPIC        Past Medical History:   Diagnosis Date    Benign neoplasm     Cervical radiculopathy     Chicken pox     Diabetes mellitus, type 2     ED (erectile dysfunction)     Hepatitis C virus infection without hepatic coma     Hyperlipidemia     Hypertension     Insomnia     LFTs abnormal     Uncontrolled diabetes mellitus      Past Surgical History:   Procedure Laterality Date    molar tooth surgery      MOLE REMOVAL      X3    ROOT CANAL      TOTAL SHOULDER ARTHROPLASTY Left 2017    Dr. Martínez     Family History   Problem Relation Name Age of Onset    Stroke Father TIFFANY Alan             Hearing loss Father TIFFANY Alan             Cancer Mother Miguelina Alan         Non-Hodgkin's lymphoma    Early death Brother Alcides Alan         Pneumonia    No Known Problems Daughter      No Known Problems Son      No Known Problems Son      Diabetes Maternal Aunt China Blackman         Type II     Social History     Tobacco Use    Smoking status: Never     Passive exposure: Never    Smokeless tobacco: Never    Tobacco comments:     Sporadic   Substance  "Use Topics    Alcohol use: Yes     Alcohol/week: 2.0 standard drinks of alcohol     Types: 2 Glasses of wine per week    Drug use: No      Review of patient's allergies indicates:  No Known Allergies    Current Outpatient Medications:     albuterol sulfate (PROAIR RESPICLICK) 90 mcg/actuation inhaler, Inhale 1 puff into the lungs every 4 (four) hours as needed for Wheezing. Rescue, Disp: 1 each, Rfl: 3    blood sugar diagnostic (CONTOUR TEST STRIPS) Strp, 1 strip by Misc.(Non-Drug; Combo Route) route once daily., Disp: 100 each, Rfl: 3    budesonide-formoterol 160-4.5 mcg (SYMBICORT) 160-4.5 mcg/actuation HFAA, INHALE 2 PUFFS BY MOUTH TWICE DAILY, Disp: 10.2 g, Rfl: 2    diazePAM (VALIUM) 5 MG tablet, Take 1 tablet (5 mg total) by mouth On call Procedure for Anxiety., Disp: 1 tablet, Rfl: 0    empagliflozin (JARDIANCE) 25 mg tablet, Take 1 tablet (25 mg total) by mouth once daily., Disp: 90 tablet, Rfl: 3    fluticasone propionate (FLONASE) 50 mcg/actuation nasal spray, 2 sprays (100 mcg total) by Each Nostril route as needed for Rhinitis., Disp: , Rfl:     gabapentin (NEURONTIN) 300 MG capsule, Take 1 capsule (300 mg total) by mouth 3 (three) times daily., Disp: 90 capsule, Rfl: 11    ibuprofen (ADVIL,MOTRIN) 600 MG tablet, Take 1 tablet (600 mg total) by mouth every 8 (eight) hours as needed for Pain., Disp: 60 tablet, Rfl: 0    insulin glargine U-100, Lantus, (LANTUS SOLOSTAR U-100 INSULIN) 100 unit/mL (3 mL) InPn pen, Inject 50 Units into the skin nightly., Disp: 45 mL, Rfl: 3    ketoconazole (NIZORAL) 2 % shampoo, SMARTSIG:Topical 2-3 Times Weekly, Disp: , Rfl:     lisinopriL (PRINIVIL,ZESTRIL) 40 MG tablet, Take 1 tablet (40 mg total) by mouth once daily., Disp: 90 tablet, Rfl: 3    metFORMIN (GLUCOPHAGE-XR) 750 MG ER 24hr tablet, Take 2 tablets (1,500 mg total) by mouth daily with breakfast., Disp: 180 tablet, Rfl: 3    pen needle, diabetic (BD ULTRA-FINE JAELYN PEN NEEDLE) 32 gauge x 5/32" Ndle, Use as " "directed with Lantus and sliding scale regular insulin, Disp: 100 each, Rfl: 9    rosuvastatin (CRESTOR) 10 MG tablet, Take 1 tablet (10 mg total) by mouth once daily., Disp: 90 tablet, Rfl: 2    semaglutide (OZEMPIC) 0.25 mg or 0.5 mg (2 mg/3 mL) pen injector, Inject 0.5 mg into the skin every 7 days., Disp: 3 mL, Rfl: 2    zolpidem (AMBIEN) 10 mg Tab, Take 1 tablet (10 mg total) by mouth nightly as needed (insomnia)., Disp: 30 tablet, Rfl: 3    Review of Systems:   Constitutional: no fever, chills or night sweats. No changes in weight   Eyes: no visual changes   ENT: no nasal congestion or sore throat   Respiratory: no cough or shortness of breath   Cardiovascular: no chest pain or palpitations   Gastrointestinal: no nausea or vomiting   Genitourinary: no hematuria or dysuria   Integument/Breast: no rash or pruritis   Hematologic/Lymphatic: no easy bruising or lymphadenopathy   Musculoskeletal: +neck pain, shoulder pain   Neurological: no seizures or tremors   Behavioral/Psych: no auditory or visual hallucinations   Endocrine: no heat or cold intolerance         OBJECTIVE:     Vital Signs (Most Recent):  Pulse: 87 (07/11/24 1305)  Resp: 18 (07/11/24 1305)  BP: (!) 148/80 (07/11/24 1305)  Estimated body mass index is 32.21 kg/m² as calculated from the following:    Height as of this encounter: 6' 2" (1.88 m).    Weight as of this encounter: 113.8 kg (250 lb 14.1 oz).    Physical Exam:   General: well developed, well nourished, no distress   Neurologic: Alert and oriented. Thought content appropriate. GCS 15.   Head: normocephalic, atraumatic  Eyes: EOMI  Neck: trachea midline, no JVD   Cardiovascular: no LE edema  Pulmonary: normal respirations, no signs of respiratory distress  Abdomen: non-distended  Sensory: intact to light touch throughout  Skin: Skin is warm, dry and intact    Motor Strength: Moves all extremities spontaneously with good tone. No abnormal movements seen.       Deltoids Triceps Biceps Wrist " Extension Wrist Flexion Hand  Interossei   Upper: R 5/5 5/5 5/5 5/5 5/5 5/5 5/5    L 5/5 5/5 5/5 5/5 5/5 5/5 5/5     DTR's: 2 +   Blair: absent    Gait: normal          Diagnostic Results:  I have independently reviewed the following imaging:  MRI cervical spine  FINDINGS:  CORD: Normal size and signal.  No syrinx.  Cervicomedullary junction is normal.     ALIGNMENT: Trace retrolisthesis of C6 on C7. Lateral masses of C1 and C2 are congruent.     BONES: Vertebral body heights are maintained.  Bilateral L3-4 facet fusion.  Mild STIR signal hyperintensity in the left greater than right C5-6 facets.     PARASPINAL AREA: Normal.     CERVICAL DISC LEVELS:     C2-C3: Mild disc osteophyte complex.  Ligamentum flavum thickening.  Moderate right and mild left facet hypertrophy.  Preserved ventral and dorsal CSF surrounding the cord.  Moderate right and minimal left foraminal stenosis.     C3-C4: Minimal disc osteophyte complex.  Mild-moderate left and mild right facet hypertrophy with bilateral facet fusion.  Minimal left foraminal stenosis.     C4-C5: Mild disc osteophyte complex.  Ligamentum flavum thickening.  Moderate right and mild left facet hypertrophy.  Slight ventral cord flattening with preserved ventral and dorsal CSF surrounding the cord.  Moderate-severe right and minimal left foraminal stenosis.     C5-C6: Mild disc osteophyte complex.  Moderate bilateral facet hypertrophy.  Mild ventral cord flattening with thin sliver preserved ventral and preserved dorsal CSF surrounding the cord.  Moderate right foraminal stenosis.     C6-C7: Trace retrolisthesis.  Moderate disc osteophyte complex with prominent bilateral uncovertebral spurring.  Mild-moderate bilateral facet hypertrophy.  Slight cord flattening with preserved ventral and dorsal CSF surrounding the cord.  Severe left and moderate-severe right foraminal stenosis.     C7-T1: No disc herniation or significant posterior osseous ridging.  Minimal bilateral  facet hypertrophy.  No significant spinal canal or foraminal stenosis.     Impression:     1. Multilevel spondylosis, greatest at C5-6 with mild cord flattening with preserved CSF surrounding the cord and no cord signal abnormality.  2. Multilevel foraminal stenosis, greatest on the left at C6-7 where it is severe.  Moderate-severe foraminal stenosis on the right at C4-5 and C6-7.  3. Hypertrophic facet changes with some STIR signal hyperintensity/inflammatory change on the left greater than right at C5-6.       ASSESSMENT/PLAN:     DDD (degenerative disc disease), cervical    Cervical radiculopathy at C6  -     Ambulatory referral/consult to Neurosurgery  -     Procedure Order to Pain Management; Future; Expected date: 07/11/2024        Noah Alan is a 68 y.o. male  Who has who has complaints of neck stiffness right-sided shoulder and arm pain.  He is neurologically intact, his stiffness of the neck and limited range of motion  He was sent for evaluation of cervical radiculopathy  He feels that he is shoulder pathology on the right in addition pain with passive range of motion  His cervical MRI was reviewed he is cervical disc degeneration with disc osteophyte complex at C5-6 and C6-7 with moderate lateral recess stenosis, no acute features he appears to have spontaneous arthrodesis at C3-4 unilaterally  At C4-5 he has no severe neural compression but disc osteophyte complex and foraminal stenosis  Reviewed in detail his risk tolerance goals  He would like to proceed with a diagnostic and therapeutic epidural steroid epidural steroid injection  In addition with the uncertainty of the situation we discussed an EMG nerve conduction study to help pinpoint a more complete diagnosis and help make decisions on moving forward with treatment options                Patient verbalized understanding of plan. Encouraged to call with any questions or concerns.     This note was partially dictated using voice recognition  software, so please excuse any errors that were not corrected.

## 2024-07-12 ENCOUNTER — TELEPHONE (OUTPATIENT)
Dept: PAIN MEDICINE | Facility: CLINIC | Age: 69
End: 2024-07-12
Payer: COMMERCIAL

## 2024-07-12 NOTE — TELEPHONE ENCOUNTER
Physician - Dr Mcmullen    Type of Procedure/Injection - Cervical Epidural  C7/T1           Laterality - NA      Anxiolysis- RNIV      Need to hold medication - Yes      NSAIDs for 2 days    Semaglutide (Ozempic) 7 days      Clearance needed - No      Follow up - 3 week

## 2024-07-16 ENCOUNTER — TELEPHONE (OUTPATIENT)
Dept: PAIN MEDICINE | Facility: CLINIC | Age: 69
End: 2024-07-16
Payer: COMMERCIAL

## 2024-07-16 DIAGNOSIS — M54.12 CERVICAL RADICULOPATHY: Primary | ICD-10-CM

## 2024-07-16 RX ORDER — SODIUM CHLORIDE, SODIUM LACTATE, POTASSIUM CHLORIDE, CALCIUM CHLORIDE 600; 310; 30; 20 MG/100ML; MG/100ML; MG/100ML; MG/100ML
INJECTION, SOLUTION INTRAVENOUS CONTINUOUS
OUTPATIENT
Start: 2024-07-16

## 2024-07-16 NOTE — TELEPHONE ENCOUNTER
Spoke with pt scheduled MARIAN with Dr. Mcmullen on 8/9/24 with RN IV sedation. Reviewed pre op instructions. Scheduled follow up.

## 2024-07-18 PROBLEM — H25.013 CORTICAL AGE-RELATED CATARACT OF BOTH EYES: Status: ACTIVE | Noted: 2024-07-18

## 2024-08-05 ENCOUNTER — OFFICE VISIT (OUTPATIENT)
Dept: PAIN MEDICINE | Facility: CLINIC | Age: 69
End: 2024-08-05
Payer: COMMERCIAL

## 2024-08-05 VITALS
BODY MASS INDEX: 32.12 KG/M2 | HEART RATE: 77 BPM | HEIGHT: 74 IN | SYSTOLIC BLOOD PRESSURE: 133 MMHG | WEIGHT: 250.25 LBS | DIASTOLIC BLOOD PRESSURE: 77 MMHG

## 2024-08-05 DIAGNOSIS — M54.2 CERVICALGIA: ICD-10-CM

## 2024-08-05 DIAGNOSIS — M54.12 CERVICAL RADICULOPATHY: Primary | ICD-10-CM

## 2024-08-05 PROCEDURE — 4010F ACE/ARB THERAPY RXD/TAKEN: CPT | Mod: CPTII,S$GLB,, | Performed by: ANESTHESIOLOGY

## 2024-08-05 PROCEDURE — 3075F SYST BP GE 130 - 139MM HG: CPT | Mod: CPTII,S$GLB,, | Performed by: ANESTHESIOLOGY

## 2024-08-05 PROCEDURE — 3061F NEG MICROALBUMINURIA REV: CPT | Mod: CPTII,S$GLB,, | Performed by: ANESTHESIOLOGY

## 2024-08-05 PROCEDURE — 3008F BODY MASS INDEX DOCD: CPT | Mod: CPTII,S$GLB,, | Performed by: ANESTHESIOLOGY

## 2024-08-05 PROCEDURE — 1101F PT FALLS ASSESS-DOCD LE1/YR: CPT | Mod: CPTII,S$GLB,, | Performed by: ANESTHESIOLOGY

## 2024-08-05 PROCEDURE — 99999 PR PBB SHADOW E&M-EST. PATIENT-LVL IV: CPT | Mod: PBBFAC,,, | Performed by: ANESTHESIOLOGY

## 2024-08-05 PROCEDURE — 1160F RVW MEDS BY RX/DR IN RCRD: CPT | Mod: CPTII,S$GLB,, | Performed by: ANESTHESIOLOGY

## 2024-08-05 PROCEDURE — 1159F MED LIST DOCD IN RCRD: CPT | Mod: CPTII,S$GLB,, | Performed by: ANESTHESIOLOGY

## 2024-08-05 PROCEDURE — 3288F FALL RISK ASSESSMENT DOCD: CPT | Mod: CPTII,S$GLB,, | Performed by: ANESTHESIOLOGY

## 2024-08-05 PROCEDURE — 1125F AMNT PAIN NOTED PAIN PRSNT: CPT | Mod: CPTII,S$GLB,, | Performed by: ANESTHESIOLOGY

## 2024-08-05 PROCEDURE — 3078F DIAST BP <80 MM HG: CPT | Mod: CPTII,S$GLB,, | Performed by: ANESTHESIOLOGY

## 2024-08-05 PROCEDURE — 3066F NEPHROPATHY DOC TX: CPT | Mod: CPTII,S$GLB,, | Performed by: ANESTHESIOLOGY

## 2024-08-05 PROCEDURE — 99214 OFFICE O/P EST MOD 30 MIN: CPT | Mod: S$GLB,,, | Performed by: ANESTHESIOLOGY

## 2024-08-05 PROCEDURE — 3052F HG A1C>EQUAL 8.0%<EQUAL 9.0%: CPT | Mod: CPTII,S$GLB,, | Performed by: ANESTHESIOLOGY

## 2024-08-05 RX ORDER — AMOXICILLIN 875 MG/1
TABLET, FILM COATED ORAL
COMMUNITY
Start: 2024-07-23

## 2024-08-05 NOTE — H&P (VIEW-ONLY)
[facility]        PCP: Erin Alexis MD    CC:   Chief Complaint   Patient presents with    Follow-up     Pre-surgery    Neck Pain    Pain    Shoulder Pain     Pre-surgery consult          8/5/2024     4:26 PM   Last 3 PDI Scores   Pain Disability Index (PDI) 44       Interval HPI 8/5/24:  The patient has previously seen the back and spine clinic however is new to me.  Today he reports he continues to have pain that radiates from the neck down in the right shoulder, 5/10, constant, sharp.    HPI:   Mr. Zamora presents for for follow up of neck and left arm radicular symptoms.  He was referred to PT last visit, but did not start.  He has been doing home exericse and home traction on his own.  He has not had any major change.  Zanaflex does help some temporarily.  Continues with intermittent burning pain in the neck, left shoulder, arm and hand.    Initial HPI:  Noah Alan is a 68 y.o. male with history of diabetes, hyperlipidemia, hypertension, hepatitis C presents with left hand numbness and neck pain  he underwent 2018 left shoulder replacement with Dr. Martínez at [facility].  He did well after post op PT.  Some time after the surgery he developed numbness in the left hand.  He did further PT, but numbness did not resolve.  He has now also developed burning pain in the neck, left shoulder, arm and hand.   He has tried advil, but no other recent treatments.      Past and current medications:  Antineuropathics:  NSAIDs:  advil  Antidepressants:  Muscle relaxers:  zanaflex  Opioids:  Antiplatelets/Anticoagulants:    Physical therapy/ Chiropractic care:  For numbness in the left hand years ago; none recently    Pain Intervention History:  none    Past Spine Surgical History:  none      History:    Current Outpatient Medications:     albuterol sulfate (PROAIR RESPICLICK) 90 mcg/actuation inhaler, Inhale 1 puff into the lungs every 4 (four) hours as needed for Wheezing. Rescue, Disp: 1  "each, Rfl: 3    amoxicillin (AMOXIL) 875 MG tablet, TAKE ONE TABLET BY MOUTH TWICE DAILY UNTIL GONE. start taking TWO DAYS prior TO surgery AND continue AFTER UNTIL GONE, Disp: , Rfl:     blood sugar diagnostic (CONTOUR TEST STRIPS) Strp, 1 strip by Misc.(Non-Drug; Combo Route) route once daily., Disp: 100 each, Rfl: 3    budesonide-formoterol 160-4.5 mcg (SYMBICORT) 160-4.5 mcg/actuation HFAA, INHALE 2 PUFFS BY MOUTH TWICE DAILY, Disp: 10.2 g, Rfl: 2    empagliflozin (JARDIANCE) 25 mg tablet, Take 1 tablet (25 mg total) by mouth once daily., Disp: 90 tablet, Rfl: 3    fluticasone propionate (FLONASE) 50 mcg/actuation nasal spray, 2 sprays (100 mcg total) by Each Nostril route as needed for Rhinitis., Disp: , Rfl:     gabapentin (NEURONTIN) 300 MG capsule, Take 1 capsule (300 mg total) by mouth 3 (three) times daily., Disp: 90 capsule, Rfl: 11    ibuprofen (ADVIL,MOTRIN) 600 MG tablet, Take 1 tablet (600 mg total) by mouth every 8 (eight) hours as needed for Pain., Disp: 60 tablet, Rfl: 0    insulin glargine U-100, Lantus, (LANTUS SOLOSTAR U-100 INSULIN) 100 unit/mL (3 mL) InPn pen, Inject 50 Units into the skin nightly., Disp: 45 mL, Rfl: 3    ketoconazole (NIZORAL) 2 % shampoo, SMARTSIG:Topical 2-3 Times Weekly, Disp: , Rfl:     lisinopriL (PRINIVIL,ZESTRIL) 40 MG tablet, Take 1 tablet (40 mg total) by mouth once daily., Disp: 90 tablet, Rfl: 3    metFORMIN (GLUCOPHAGE) 1000 MG tablet, Take 1 tablet (1,000 mg total) by mouth 2 (two) times daily with meals., Disp: 180 tablet, Rfl: 3    pen needle, diabetic (BD ULTRA-FINE JAELYN PEN NEEDLE) 32 gauge x 5/32" Ndle, Use as directed with Lantus and sliding scale regular insulin, Disp: 100 each, Rfl: 9    rosuvastatin (CRESTOR) 10 MG tablet, Take 1 tablet (10 mg total) by mouth every evening., Disp: 90 tablet, Rfl: 3    semaglutide (OZEMPIC) 1 mg/dose (4 mg/3 mL), Inject 1 mg into the skin every 7 days., Disp: 3 mL, Rfl: 2    zolpidem (AMBIEN) 10 mg Tab, Take 1 tablet (10 " mg total) by mouth nightly as needed (insomnia)., Disp: 30 tablet, Rfl: 3    Past Medical History:   Diagnosis Date    Benign neoplasm     Cervical radiculopathy 2018    Chicken pox     Diabetes mellitus, type 2     ED (erectile dysfunction)     Hepatitis C virus infection without hepatic coma     Hyperlipidemia     Hypertension     Insomnia     LFTs abnormal     Uncontrolled diabetes mellitus        Past Surgical History:   Procedure Laterality Date    molar tooth surgery      MOLE REMOVAL      X3    ROOT CANAL      TOTAL SHOULDER ARTHROPLASTY Left 2017    Dr. Martínez       Family History   Problem Relation Name Age of Onset    Stroke Father TIFFANY Alan             Hearing loss Father TIFFANY Alan             Cancer Mother Miguelina Alan         Non-Hodgkin's lymphoma    Early death Brother Alcides Alan         Pneumonia    No Known Problems Daughter      No Known Problems Son      No Known Problems Son      Diabetes Maternal Aunt China Bustamantevey         Type II       Social History     Socioeconomic History    Marital status:    Tobacco Use    Smoking status: Never     Passive exposure: Never    Smokeless tobacco: Never    Tobacco comments:     Sporadic   Substance and Sexual Activity    Alcohol use: Yes     Alcohol/week: 2.0 standard drinks of alcohol     Types: 2 Glasses of wine per week    Drug use: No    Sexual activity: Yes     Partners: Female     Birth control/protection: None     Social Determinants of Health     Financial Resource Strain: Low Risk  (7/15/2024)    Overall Financial Resource Strain (CARDIA)     Difficulty of Paying Living Expenses: Not hard at all   Food Insecurity: No Food Insecurity (7/15/2024)    Hunger Vital Sign     Worried About Running Out of Food in the Last Year: Never true     Ran Out of Food in the Last Year: Never true   Transportation Needs: No Transportation Needs (3/7/2023)    PRAPARE - Transportation     Lack of Transportation (Medical): No     Lack of  "Transportation (Non-Medical): No   Physical Activity: Sufficiently Active (7/15/2024)    Exercise Vital Sign     Days of Exercise per Week: 4 days     Minutes of Exercise per Session: 40 min   Stress: Stress Concern Present (7/15/2024)    [employer] [place] [employer] - Occupational Stress Questionnaire     Feeling of Stress : To some extent   Housing Stability: High Risk (7/15/2024)    Housing Stability Vital Sign     Unable to Pay for Housing in the Last Year: Yes       Review of patient's allergies indicates:  No Known Allergies      Review of Systems:  Neck pain, arm pain, hand numbness.  Balance of review of systems is negative.    Physical Exam:  Vitals:    08/05/24 1622   BP: 133/77   Pulse: 77   Weight: 113.5 kg (250 lb 3.6 oz)   Height: 6' 2" (1.88 m)   PainSc:   5   PainLoc: Shoulder     Body mass index is 32.13 kg/m².    Gen: NAD  Psych: mood appropriate for given condition  HEENT: eyes anicteric   CV: RRR, 2+ radial pulse  HEENT: anicteric   Respiratory: non-labored, no signs of respiratory distress  Abd: non-distended  Skin: warm, dry and intact.  Gait: No antalgic gait.     Sensory:  Intact and symmetrical to light touch in C4-T1 dermatomes bilaterally.    Motor:    Right Left   C4 Shoulder Abduction  5  5   C5 Elbow Flexion    5  5   C6 Wrist Extension  5  5   C7 Elbow Extension   5  5   C8/T1 Hand Intrinsics   5  5      Right Left   Triceps DTR 0 1+   Biceps DTR 0 1+        Patellar DTR 2+ 2+   Achilles DTR 2+ 2+                    Imaging:  MRI cervical spine 6/22/24  FINDINGS:  CORD: Normal size and signal.  No syrinx.  Cervicomedullary junction is normal.     ALIGNMENT: Trace retrolisthesis of C6 on C7. Lateral masses of C1 and C2 are congruent.     BONES: Vertebral body heights are maintained.  Bilateral L3-4 facet fusion.  Mild STIR signal hyperintensity in the left greater than right C5-6 facets.     PARASPINAL AREA: Normal.     CERVICAL DISC LEVELS:  C2-C3: Mild disc osteophyte " complex.  Ligamentum flavum thickening.  Moderate right and mild left facet hypertrophy.  Preserved ventral and dorsal CSF surrounding the cord.  Moderate right and minimal left foraminal stenosis.  C3-C4: Minimal disc osteophyte complex.  Mild-moderate left and mild right facet hypertrophy with bilateral facet fusion.  Minimal left foraminal stenosis.  C4-C5: Mild disc osteophyte complex.  Ligamentum flavum thickening.  Moderate right and mild left facet hypertrophy.  Slight ventral cord flattening with preserved ventral and dorsal CSF surrounding the cord.  Moderate-severe right and minimal left foraminal stenosis.  C5-C6: Mild disc osteophyte complex.  Moderate bilateral facet hypertrophy.  Mild ventral cord flattening with thin sliver preserved ventral and preserved dorsal CSF surrounding the cord.  Moderate right foraminal stenosis.  C6-C7: Trace retrolisthesis.  Moderate disc osteophyte complex with prominent bilateral uncovertebral spurring.  Mild-moderate bilateral facet hypertrophy.  Slight cord flattening with preserved ventral and dorsal CSF surrounding the cord.  Severe left and moderate-severe right foraminal stenosis.  C7-T1: No disc herniation or significant posterior osseous ridging.  Minimal bilateral facet hypertrophy.  No significant spinal canal or foraminal stenosis.    Labs:  Lab Results   Component Value Date    HGBA1C 8.2 (H) 07/16/2024       Lab Results   Component Value Date    WBC 7.25 01/03/2024    HGB 15.4 01/03/2024    HCT 46.2 01/03/2024    MCV 87 01/03/2024     01/03/2024           Assessment:     Mr. Zamora has chronic neck pain with left arm radiculoapthy - likely left C6, C7 radiculopathy.  No change with home exercise ant traction.  Will cotninue with home treatments at this time.  Call when/ if he is ready to start PT.  Discussed INNA again.  With past HgbA1C levells, he was not an ideal candidate for INNA.  He has diabetes check up neck week.  If blood glucose levels are  better controlled and ok with Dr. Alexis may obtain MRi and concsider INNA.  Will call hen ready to proceed.  Follow up as needed.        Problem List Items Addressed This Visit          Neuro    Cervical radiculopathy - Primary     Other Visit Diagnoses       Cervicalgia                8/5/24 - The patient has previously seen the back and spine clinic however is new to me.  Today he reports he continues to have pain that radiates from the neck down in the right shoulder, 5/10, constant, sharp.    - on exam he has full strength in his upper extremities intact sensation to light touch bilateral C4-T1.  He has pain with Neer's on the right.  Absent right biceps and triceps DTR  - I independently reviewed his cervical MRI and at C4-5 he has moderate-to-severe right foraminal narrowing, at C5-6 he has moderate right foraminal narrowing, at C6-7 he has moderate-to-severe foraminal narrowing on the right  - over the past few weeks he has been participating in formal physical therapy however continues to have pain that is limiting his mobility and quality of life  - he was referred by Orthopedics to Neurosurgery for an evaluation.  Neurosurgery recommended a diagnostic/therapeutic cervical INNA.  He is scheduled for cervical C7-T1 INNA.  - he will follow up 2-3 weeks post injection      : Not applicable      This note was completed with dictation software and grammatical errors may exist.

## 2024-08-09 ENCOUNTER — HOSPITAL ENCOUNTER (OUTPATIENT)
Dept: RADIOLOGY | Facility: HOSPITAL | Age: 69
Discharge: HOME | End: 2024-08-09
Attending: ANESTHESIOLOGY | Admitting: ANESTHESIOLOGY
Payer: COMMERCIAL

## 2024-08-09 ENCOUNTER — HOSPITAL ENCOUNTER (OUTPATIENT)
Facility: HOSPITAL | Age: 69
Discharge: HOME | End: 2024-08-09
Attending: ANESTHESIOLOGY | Admitting: ANESTHESIOLOGY
Payer: COMMERCIAL

## 2024-08-09 VITALS
BODY MASS INDEX: 32.08 KG/M2 | TEMPERATURE: 98 F | DIASTOLIC BLOOD PRESSURE: 64 MMHG | WEIGHT: 250 LBS | HEIGHT: 74 IN | SYSTOLIC BLOOD PRESSURE: 105 MMHG | OXYGEN SATURATION: 98 % | HEART RATE: 73 BPM | RESPIRATION RATE: 15 BRPM

## 2024-08-09 DIAGNOSIS — M54.12 CERVICAL RADICULOPATHY: Primary | ICD-10-CM

## 2024-08-09 DIAGNOSIS — M54.2 NECK PAIN: ICD-10-CM

## 2024-08-09 LAB — GLUCOSE SERPL-MCNC: 130 MG/DL (ref 70–110)

## 2024-08-09 PROCEDURE — 63600175 PHARM REV CODE 636 W HCPCS: Mod: PO | Performed by: ANESTHESIOLOGY

## 2024-08-09 PROCEDURE — 62321 NJX INTERLAMINAR CRV/THRC: CPT | Mod: PO | Performed by: ANESTHESIOLOGY

## 2024-08-09 PROCEDURE — 62321 NJX INTERLAMINAR CRV/THRC: CPT | Mod: ,,, | Performed by: ANESTHESIOLOGY

## 2024-08-09 PROCEDURE — 25500020 PHARM REV CODE 255: Mod: PO | Performed by: ANESTHESIOLOGY

## 2024-08-09 PROCEDURE — 25000003 PHARM REV CODE 250: Mod: PO | Performed by: ANESTHESIOLOGY

## 2024-08-09 RX ORDER — MIDAZOLAM HYDROCHLORIDE 1 MG/ML
INJECTION INTRAMUSCULAR; INTRAVENOUS
Status: DISCONTINUED | OUTPATIENT
Start: 2024-08-09 | End: 2024-08-09 | Stop reason: HOSPADM

## 2024-08-09 RX ORDER — SODIUM CHLORIDE, SODIUM LACTATE, POTASSIUM CHLORIDE, CALCIUM CHLORIDE 600; 310; 30; 20 MG/100ML; MG/100ML; MG/100ML; MG/100ML
INJECTION, SOLUTION INTRAVENOUS CONTINUOUS
Status: DISCONTINUED | OUTPATIENT
Start: 2024-08-09 | End: 2024-08-09 | Stop reason: HOSPADM

## 2024-08-09 RX ORDER — LIDOCAINE HYDROCHLORIDE 10 MG/ML
INJECTION, SOLUTION EPIDURAL; INFILTRATION; INTRACAUDAL; PERINEURAL
Status: DISCONTINUED | OUTPATIENT
Start: 2024-08-09 | End: 2024-08-09 | Stop reason: HOSPADM

## 2024-08-09 RX ORDER — DEXAMETHASONE SODIUM PHOSPHATE 10 MG/ML
INJECTION INTRAMUSCULAR; INTRAVENOUS
Status: DISCONTINUED | OUTPATIENT
Start: 2024-08-09 | End: 2024-08-09 | Stop reason: HOSPADM

## 2024-08-09 RX ADMIN — SODIUM CHLORIDE, POTASSIUM CHLORIDE, SODIUM LACTATE AND CALCIUM CHLORIDE: 600; 310; 30; 20 INJECTION, SOLUTION INTRAVENOUS at 08:08

## 2024-08-09 NOTE — DISCHARGE SUMMARY
[facility]  Discharge Note  Short Stay    Admit Date: 8/9/2024    Discharge Date: 8/9/2024    Attending Physician: Usama Mcmullen     Discharge Provider: Usama Mcmullen    Diagnoses:  There are no hospital problems to display for this patient.      Discharged Condition: Good    Final Diagnoses: Cervical radiculopathy [M54.12]    Disposition: Home or Self Care    Hospital Course: No complications, uneventful    Outcome of Hospitalization, Treatment, Procedure, or Surgery:  Patient was admitted for outpatient interventional pain management procedure. The patient tolerated the procedure well with no complications.    Follow up/Patient Instructions:  Follow up as scheduled in Pain Management office in 2-3 weeks.  Patient has received instructions and follow up date and time.    Medications:  Continue previous medications    Discharge Procedure Orders   Notify your health care provider if you experience any of the following:  temperature >100.4     Notify your health care provider if you experience any of the following:  persistent nausea and vomiting or diarrhea     Notify your health care provider if you experience any of the following:  severe uncontrolled pain     Notify your health care provider if you experience any of the following:  redness, tenderness, or signs of infection (pain, swelling, redness, odor or green/yellow discharge around incision site)     Notify your health care provider if you experience any of the following:  difficulty breathing or increased cough     Notify your health care provider if you experience any of the following:  severe persistent headache     Notify your health care provider if you experience any of the following:  worsening rash     Notify your health care provider if you experience any of the following:  persistent dizziness, light-headedness, or visual disturbances     Notify your health care provider if you experience any of the following:  increased confusion or  weakness     Activity as tolerated

## 2024-08-09 NOTE — OP NOTE
"Procedure Note    Procedure Date: 8/9/2024    Procedure Performed:  C7-T1 cervical interlaminar epidural steroid injection under fluoroscopy.    Indications:  Noah Alan presents with cervical radiculitis/radiculopathy secondary to disc herniation, osteophyte/osteophyte complexes, and/or severe degenerative disc disease producing foraminal or central spinal stenosis.  The pain has been present for at least 4 weeks and the patient has failed to respond to noninvasive conservative care.  Pain rated by NRS at baseline prior to intervention is 6/10.  Their radiculitis/radiculopathy and/or neurogenic claudication is severe enough to greatly impact their quality of life or function.      Pre-op diagnosis: Cervical Radiculitis/Radiculopathy    Post-op diagnosis: same    Physician: Usama Mcmullen MD    IV anxiolysis medications: versed 2mg    Medications injected: Dexamethasone 15mg, 3.5 mL sterile, preservative-free normal saline.    Local anesthetic used: 1% Lidocaine, 1 ml    Estimated Blood Loss: none    Complications:  none    Technique:  The patient was interviewed in the holding area and Risks/Benefits were discussed, including, but not limited to, the possibility of new or different pain, bleeding or infection.   All questions were answered.  The patient understood and accepted risks.  Consent was verfied.  A time-out was taken to identify patient and procedure prior to starting the procedure.  With the patient laying in a prone position with the neck in a mid-flexed forward position, the area was prepped and draped in the usual sterile fashion using ChloraPrep x2 and a fenestrated drape.  The area was determined under AP fluoroscopic guidance.  The skin and subcutaneous tissues overlying the targeted interspace were anesthetized with 3-5 mL of 1% Lidocaine using a 25G 1.5" needle.  A 20G, 3.5" Tuohy epidural needle was inserted through the anesthetized skin and directed toward the interspace under fluoroscopic " guidance until T1 lamina was contacted.  The fluoroscope was then adjusted to yield a contralateral oblique view of the C7-T1 interspace.  The epidural needle was incrementally walked cephalad off of the lamina until the ligamentum flavum was engaged. From this point, a loss-of-resistance technique was used to identify entrance of the needle into the epidural space.  Once the tip of the needle was in the desired position, the contrast dye Omnipaque was injected to determine placement and no vascular uptake.  Then, after negative aspiration, dexamethasone 15 mg + 3.5 cc NS was injected.  The needle was flushed with normal saline and removed. The contrast was seen to be displaced after injection. Patient was awake/responsive during all injections.  The patient tolerated the procedure well and was transferred to the [facility] in stable condition.  The patient was monitored after the procedure and was given post-procedure and discharge instructions to follow at home. The patient was discharged in a stable condition.

## 2024-08-09 NOTE — INTERVAL H&P NOTE
The patient has been examined and the H&P has been reviewed:    I concur with the findings and no changes have occurred since H&P was written.  The risks and benefits of this intervention, and alternative therapies were discussed with the patient.  The discussion of risks included infection, bleeding, need for additional procedures or surgery, nerve damage.  Questions regarding the procedure, risks, expected outcome, and possible side effects were solicited and answered to the patient's satisfaction.  Noah Alan wishes to proceed with the injection or procedure.  Written consent was obtained.  ASA 2, MP II        There are no hospital problems to display for this patient.

## 2024-08-30 ENCOUNTER — OFFICE VISIT (OUTPATIENT)
Dept: PAIN MEDICINE | Facility: CLINIC | Age: 69
End: 2024-08-30
Payer: COMMERCIAL

## 2024-08-30 VITALS
SYSTOLIC BLOOD PRESSURE: 153 MMHG | WEIGHT: 250.25 LBS | DIASTOLIC BLOOD PRESSURE: 72 MMHG | BODY MASS INDEX: 32.12 KG/M2 | HEART RATE: 77 BPM | HEIGHT: 74 IN

## 2024-08-30 DIAGNOSIS — M54.2 CERVICALGIA: ICD-10-CM

## 2024-08-30 DIAGNOSIS — M25.511 CHRONIC RIGHT SHOULDER PAIN: Primary | ICD-10-CM

## 2024-08-30 DIAGNOSIS — M75.101 ROTATOR CUFF SYNDROME OF RIGHT SHOULDER: ICD-10-CM

## 2024-08-30 DIAGNOSIS — M54.12 CERVICAL RADICULOPATHY: ICD-10-CM

## 2024-08-30 DIAGNOSIS — G89.29 CHRONIC RIGHT SHOULDER PAIN: Primary | ICD-10-CM

## 2024-08-30 PROCEDURE — 99999 PR PBB SHADOW E&M-EST. PATIENT-LVL IV: CPT | Mod: PBBFAC,,,

## 2024-08-30 RX ORDER — TIZANIDINE 4 MG/1
4 TABLET ORAL EVERY 8 HOURS
Qty: 40 TABLET | Refills: 1 | Status: SHIPPED | OUTPATIENT
Start: 2024-08-30 | End: 2024-09-09

## 2024-08-30 NOTE — PROGRESS NOTES
[facility] Established Patient        PCP: Erin Alexis MD    CC:   Chief Complaint   Patient presents with    Neck Pain    Back Pain    Shoulder Pain          8/30/2024     8:05 AM 8/5/2024     4:26 PM   Last 3 PDI Scores   Pain Disability Index (PDI) 46 44     Interval HPI 8/30/2024: Noah Alan returns to the office for follow up.  He is s/p cervical INNA on 08/09/2024 with minimal relief.  Today's reporting continued right shoulder pain with radiation down right arm into right and.  Pain is worsened with certain activities in at night when trying to sleep.  He denies any new numbness, weakness or any new changes to his bowel bladder function.  He continues to take gabapentin, Tylenol, NSAIDs with minimal relief.  He also continues to attend formal physical therapy.    HPI:   Mr. Zamora presents for for follow up of neck and left arm radicular symptoms.  He was referred to PT last visit, but did not start.  He has been doing home exericse and home traction on his own.  He has not had any major change.  Zanaflex does help some temporarily.  Continues with intermittent burning pain in the neck, left shoulder, arm and hand.    Initial HPI:  Noah Alan is a 68 y.o. male with history of diabetes, hyperlipidemia, hypertension, hepatitis C presents with left hand numbness and neck pain  he underwent 2018 left shoulder replacement with Dr. Martínez at [facility].  He did well after post op PT.  Some time after the surgery he developed numbness in the left hand.  He did further PT, but numbness did not resolve.  He has now also developed burning pain in the neck, left shoulder, arm and hand.   He has tried advil, but no other recent treatments.      Past and current medications:  Antineuropathics:  NSAIDs:  advil  Antidepressants:  Muscle relaxers:  zanaflex  Opioids:  Antiplatelets/Anticoagulants:    Physical therapy/ Chiropractic care:  For numbness in the left hand years ago; none recently    Pain  "Intervention History:  - s/p cervical INNA on 08/09/2024 with minimal relief.    Past Spine Surgical History:  none      History:    Current Outpatient Medications:     albuterol sulfate (PROAIR RESPICLICK) 90 mcg/actuation inhaler, Inhale 1 puff into the lungs every 4 (four) hours as needed for Wheezing. Rescue, Disp: 1 each, Rfl: 3    amoxicillin (AMOXIL) 875 MG tablet, , Disp: , Rfl:     blood sugar diagnostic (CONTOUR TEST STRIPS) Strp, 1 strip by Misc.(Non-Drug; Combo Route) route once daily., Disp: 100 each, Rfl: 3    budesonide-formoterol 160-4.5 mcg (SYMBICORT) 160-4.5 mcg/actuation HFAA, INHALE 2 PUFFS BY MOUTH TWICE DAILY, Disp: 10.2 g, Rfl: 2    empagliflozin (JARDIANCE) 25 mg tablet, Take 1 tablet (25 mg total) by mouth once daily., Disp: 90 tablet, Rfl: 3    fluticasone propionate (FLONASE) 50 mcg/actuation nasal spray, 2 sprays (100 mcg total) by Each Nostril route as needed for Rhinitis., Disp: , Rfl:     gabapentin (NEURONTIN) 300 MG capsule, Take 1 capsule (300 mg total) by mouth 3 (three) times daily., Disp: 90 capsule, Rfl: 11    ibuprofen (ADVIL,MOTRIN) 600 MG tablet, Take 1 tablet (600 mg total) by mouth every 8 (eight) hours as needed for Pain., Disp: 60 tablet, Rfl: 0    insulin glargine U-100, Lantus, (LANTUS SOLOSTAR U-100 INSULIN) 100 unit/mL (3 mL) InPn pen, Inject 50 Units into the skin nightly., Disp: 45 mL, Rfl: 3    ketoconazole (NIZORAL) 2 % shampoo, SMARTSIG:Topical 2-3 Times Weekly, Disp: , Rfl:     lisinopriL (PRINIVIL,ZESTRIL) 40 MG tablet, Take 1 tablet (40 mg total) by mouth once daily., Disp: 90 tablet, Rfl: 3    metFORMIN (GLUCOPHAGE) 1000 MG tablet, Take 1 tablet (1,000 mg total) by mouth 2 (two) times daily with meals., Disp: 180 tablet, Rfl: 3    pen needle, diabetic (BD ULTRA-FINE JAELYN PEN NEEDLE) 32 gauge x 5/32" Ndle, Use as directed with Lantus and sliding scale regular insulin, Disp: 100 each, Rfl: 9    rosuvastatin (CRESTOR) 10 MG tablet, Take 1 tablet (10 mg total) by " mouth every evening., Disp: 90 tablet, Rfl: 3    semaglutide (OZEMPIC) 1 mg/dose (4 mg/3 mL), Inject 1 mg into the skin every 7 days., Disp: 3 mL, Rfl: 2    zolpidem (AMBIEN) 10 mg Tab, Take 1 tablet (10 mg total) by mouth nightly as needed (insomnia)., Disp: 30 tablet, Rfl: 3    diazePAM (VALIUM) 5 MG tablet, Take 1 tablet (5 mg total) by mouth once. To be taken 30 minutes prior to MRI for 1 dose, Disp: 1 tablet, Rfl: 0    HYDROcodone-acetaminophen (NORCO)  mg per tablet, Take by mouth. (Patient not taking: Reported on 2024), Disp: , Rfl:     tiZANidine (ZANAFLEX) 4 MG tablet, Take 1 tablet (4 mg total) by mouth every 8 (eight) hours. for 10 days, Disp: 40 tablet, Rfl: 01    Past Medical History:   Diagnosis Date    Benign neoplasm     Cervical radiculopathy     Chicken pox     Diabetes mellitus, type 2     ED (erectile dysfunction)     Hepatitis C virus infection without hepatic coma     Hyperlipidemia     Hypertension     Insomnia     LFTs abnormal     Uncontrolled diabetes mellitus        Past Surgical History:   Procedure Laterality Date    EPIDURAL STEROID INJECTION INTO CERVICAL SPINE N/A 2024    Procedure: Injection-steroid-epidural-cervical C7/T1;  Surgeon: Usama Mcmullen MD;  Location: [facility];  Service: Pain Management;  Laterality: N/A;  C7/T1    molar tooth surgery      MOLE REMOVAL      X3    ROOT CANAL      TOTAL SHOULDER ARTHROPLASTY Left 2017    Dr. Martínez       Family History   Problem Relation Name Age of Onset    Stroke Father BJ Alan             Hearing loss Father BJ Alan             Cancer Mother Miguelina Alan         Non-Hodgkin's lymphoma    Early death Brother Alcides Alan         Pneumonia    No Known Problems Daughter      No Known Problems Son      No Known Problems Son      Diabetes Maternal Aunt China Blackman         Type II       Social History     Socioeconomic History    Marital status:    Tobacco Use    Smoking status: Some Days      "Types: Cigars     Passive exposure: Never    Smokeless tobacco: Never    Tobacco comments:     Sporadic   Substance and Sexual Activity    Alcohol use: Yes     Alcohol/week: 2.0 standard drinks of alcohol     Types: 2 Glasses of wine per week    Drug use: No    Sexual activity: Yes     Partners: Female     Birth control/protection: None     Social Determinants of Health     Financial Resource Strain: Low Risk  (7/15/2024)    Overall Financial Resource Strain (CARDIA)     Difficulty of Paying Living Expenses: Not hard at all   Food Insecurity: No Food Insecurity (7/15/2024)    Hunger Vital Sign     Worried About Running Out of Food in the Last Year: Never true     Ran Out of Food in the Last Year: Never true   Transportation Needs: No Transportation Needs (3/7/2023)    PRAPARE - Transportation     Lack of Transportation (Medical): No     Lack of Transportation (Non-Medical): No   Physical Activity: Sufficiently Active (7/15/2024)    Exercise Vital Sign     Days of Exercise per Week: 4 days     Minutes of Exercise per Session: 40 min   Stress: Stress Concern Present (7/15/2024)    [employer] [place] [employer] - Occupational Stress Questionnaire     Feeling of Stress : To some extent   Housing Stability: High Risk (7/15/2024)    Housing Stability Vital Sign     Unable to Pay for Housing in the Last Year: Yes       Review of patient's allergies indicates:  No Known Allergies      Review of Systems:  Neck pain, arm pain, hand numbness.  Balance of review of systems is negative.    Physical Exam:  Vitals:    08/30/24 0812   BP: (!) 153/72   Pulse: 77   Weight: 113.5 kg (250 lb 3.6 oz)   Height: 6' 2" (1.88 m)   PainSc:   7   PainLoc: Back       Body mass index is 32.13 kg/m².    Gen: NAD  Psych: mood appropriate for given condition  HEENT: eyes anicteric   CV: RRR, 2+ radial pulse  HEENT: anicteric   Respiratory: non-labored, no signs of respiratory distress  Abd: non-distended  Skin: warm, dry and " intact.  Gait: No antalgic gait.     Sensory:  Intact and symmetrical to light touch in C4-T1 dermatomes bilaterally.    Motor:    Right Left   C4 Shoulder Abduction  5  5   C5 Elbow Flexion    5  5   C6 Wrist Extension  5  5   C7 Elbow Extension   5  5   C8/T1 Hand Intrinsics   5  5      Right Left   Triceps DTR 0 1+   Biceps DTR 0 1+        Patellar DTR 2+ 2+   Achilles DTR 2+ 2+                    Imaging:  MRI cervical spine 6/22/24  FINDINGS:  CORD: Normal size and signal.  No syrinx.  Cervicomedullary junction is normal.     ALIGNMENT: Trace retrolisthesis of C6 on C7. Lateral masses of C1 and C2 are congruent.     BONES: Vertebral body heights are maintained.  Bilateral L3-4 facet fusion.  Mild STIR signal hyperintensity in the left greater than right C5-6 facets.     PARASPINAL AREA: Normal.     CERVICAL DISC LEVELS:  C2-C3: Mild disc osteophyte complex.  Ligamentum flavum thickening.  Moderate right and mild left facet hypertrophy.  Preserved ventral and dorsal CSF surrounding the cord.  Moderate right and minimal left foraminal stenosis.  C3-C4: Minimal disc osteophyte complex.  Mild-moderate left and mild right facet hypertrophy with bilateral facet fusion.  Minimal left foraminal stenosis.  C4-C5: Mild disc osteophyte complex.  Ligamentum flavum thickening.  Moderate right and mild left facet hypertrophy.  Slight ventral cord flattening with preserved ventral and dorsal CSF surrounding the cord.  Moderate-severe right and minimal left foraminal stenosis.  C5-C6: Mild disc osteophyte complex.  Moderate bilateral facet hypertrophy.  Mild ventral cord flattening with thin sliver preserved ventral and preserved dorsal CSF surrounding the cord.  Moderate right foraminal stenosis.  C6-C7: Trace retrolisthesis.  Moderate disc osteophyte complex with prominent bilateral uncovertebral spurring.  Mild-moderate bilateral facet hypertrophy.  Slight cord flattening with preserved ventral and dorsal CSF surrounding  the cord.  Severe left and moderate-severe right foraminal stenosis.  C7-T1: No disc herniation or significant posterior osseous ridging.  Minimal bilateral facet hypertrophy.  No significant spinal canal or foraminal stenosis.    Labs:  Lab Results   Component Value Date    HGBA1C 8.2 (H) 07/16/2024       Lab Results   Component Value Date    WBC 7.25 01/03/2024    HGB 15.4 01/03/2024    HCT 46.2 01/03/2024    MCV 87 01/03/2024     01/03/2024           Assessment:     Mr. Zamora has chronic neck pain with left arm radiculoapthy - likely left C6, C7 radiculopathy.  No change with home exercise ant traction.  Will cotninue with home treatments at this time.  Call when/ if he is ready to start PT.  Discussed INNA again.  With past HgbA1C levells, he was not an ideal candidate for INNA.  He has diabetes check up neck week.  If blood glucose levels are better controlled and ok with Dr. Alexis may obtain MRi and concsider INNA.  Will call hen ready to proceed.  Follow up as needed.        Problem List Items Addressed This Visit          Neuro    Cervical radiculopathy    Relevant Medications    tiZANidine (ZANAFLEX) 4 MG tablet     Other Visit Diagnoses       Chronic right shoulder pain    -  Primary    Cervicalgia        Rotator cuff syndrome of right shoulder                8/30/2024: Noah Alan returns to the office for follow up.  He is s/p cervical INNA on 08/09/2024 with minimal relief.  Today's reporting continued right shoulder pain with radiation down right arm into right and.  Pain is worsened with certain activities in at night when trying to sleep.  He denies any new numbness, weakness or any new changes to his bowel bladder function.  He continues to take gabapentin, Tylenol, NSAIDs with minimal relief.  He also continues to attend formal physical therapy.    -  He is s/p cervical INNA on 08/09/2024 with minimal relief.  - I independently reviewed his cervical MRI and at C4-5 he has moderate-to-severe  right foraminal narrowing, at C5-6 he has moderate right foraminal narrowing, at C6-7 he has moderate-to-severe foraminal narrowing on the right  - unfortunately, he found only minimal relief with cervical INNA.  He likely continues have significant overlapping issues however I believe majority of his pain is originating from his right shoulder.  He is following closely with Orthopedics, they have ordered MRI of right shoulder.  They have also ordered EMG/NCS for further information.  - at this time, recommend continuing with formal physical therapy, chiropractic care and we will re-evaluate after MRI of shoulder an EMG.  - discuss for the neuropathic component continue with gabapentin.  He was becoming drowsy during the day, recommend increasing to 600 mg nightly in discontinue daily dose.  - for any myofascial component, prescription for tizanidine provided today.  Discussed this to can cause drowsiness.  - follow up as needed, he will reach out to us after EMG.        : Not applicable      This note was completed with dictation software and grammatical errors may exist.

## 2024-10-02 ENCOUNTER — TELEPHONE (OUTPATIENT)
Dept: PAIN MEDICINE | Facility: CLINIC | Age: 69
End: 2024-10-02
Payer: COMMERCIAL

## 2024-10-02 DIAGNOSIS — M54.12 CERVICAL RADICULOPATHY: Primary | ICD-10-CM

## 2024-10-02 RX ORDER — GABAPENTIN 600 MG/1
600 TABLET ORAL NIGHTLY
Qty: 90 TABLET | Refills: 3 | Status: SHIPPED | OUTPATIENT
Start: 2024-10-02 | End: 2024-12-31

## 2024-10-02 NOTE — TELEPHONE ENCOUNTER
I have reviewed the [place] Board [employer] website and there are no abberancies.        Refil sent

## 2024-10-02 NOTE — TELEPHONE ENCOUNTER
----- Message from Nurse Mario sent at 10/1/2024  6:01 PM CDT -----  Regarding: FW: Please call about gabapenton  I spoke with Mr. Alan,   He states that at his visit with you the dose of Gabapentin that was originally prescribed by Dr Medina was adjusted.  He is currently doing well on the dose you recommended.  Would you be so kind to send prescription for your recommended dose for Mr. Alan.  He uses [facility].  I appreciate your time.   Shelbi  ----- Message -----  From: Lynnette Soto  Sent: 10/1/2024   9:36 AM CDT  To: Horace Alexandra Staff  Subject: Please call about gabapenton                     gabapentin (NEURONTIN) 300 MG capsule 90 capsule 11 6/25/2024    Another Dr upped his dose to double and JL ok'ed that.  He need a new RX reflecting that but CALL before doing anything because it is confusing what he was saying.      [phone]

## 2024-10-21 PROBLEM — M75.01 ADHESIVE CAPSULITIS OF RIGHT SHOULDER: Status: ACTIVE | Noted: 2024-10-21

## 2025-02-03 ENCOUNTER — OFFICE VISIT (OUTPATIENT)
Dept: ORTHOPEDICS | Facility: CLINIC | Age: 70
End: 2025-02-03
Payer: COMMERCIAL

## 2025-02-03 DIAGNOSIS — G56.03 CARPAL TUNNEL SYNDROME, BILATERAL: Primary | ICD-10-CM

## 2025-02-03 PROCEDURE — 99204 OFFICE O/P NEW MOD 45 MIN: CPT | Mod: 25,S$GLB,, | Performed by: ORTHOPAEDIC SURGERY

## 2025-02-03 PROCEDURE — 4010F ACE/ARB THERAPY RXD/TAKEN: CPT | Mod: CPTII,S$GLB,, | Performed by: ORTHOPAEDIC SURGERY

## 2025-02-03 PROCEDURE — 20526 THER INJECTION CARP TUNNEL: CPT | Mod: 50,S$GLB,, | Performed by: ORTHOPAEDIC SURGERY

## 2025-02-03 RX ORDER — TRIAMCINOLONE ACETONIDE 40 MG/ML
40 INJECTION, SUSPENSION INTRA-ARTICULAR; INTRAMUSCULAR
Status: DISCONTINUED | OUTPATIENT
Start: 2025-02-03 | End: 2025-02-03 | Stop reason: HOSPADM

## 2025-02-03 RX ADMIN — TRIAMCINOLONE ACETONIDE 40 MG: 40 INJECTION, SUSPENSION INTRA-ARTICULAR; INTRAMUSCULAR at 08:02

## 2025-02-03 NOTE — PROGRESS NOTES
2/3/2025    Chief Complaint:  No chief complaint on file.      HPI:  Noah Alan is a 69 y.o. male, who presents to clinic today has a history of some sensory changes in his hands.  He also has had some areas where he is having some cramping type activities specifically of his left hand with activity.  He has had a nerve conduction study which showed some carpal tunnel syndrome.  He is here today for further evaluation and treatment.    PMHX:  Past Medical History:   Diagnosis Date    Benign neoplasm     Cervical radiculopathy 2018    Chicken pox     Diabetes mellitus, type 2     ED (erectile dysfunction)     Hepatitis C virus infection without hepatic coma     Hyperlipidemia     Hypertension     Insomnia     LFTs abnormal     Uncontrolled diabetes mellitus        PSHX:  Past Surgical History:   Procedure Laterality Date    EPIDURAL STEROID INJECTION INTO CERVICAL SPINE N/A 2024    Procedure: Injection-steroid-epidural-cervical C7/T1;  Surgeon: Usama Mcmullen MD;  Location: [facility] OR;  Service: Pain Management;  Laterality: N/A;  C7/T1    molar tooth surgery      MOLE REMOVAL      X3    ROOT CANAL      TOTAL SHOULDER ARTHROPLASTY Left 2017    Dr. Martínez       FMHX:  Family History   Problem Relation Name Age of Onset    Stroke Father TIFFANY Alan             Hearing loss Father TIFFANY Alan             Cancer Mother Miguelina Alan         Non-Hodgkin's lymphoma    Early death Brother Alcides Alan         Pneumonia    No Known Problems Daughter      No Known Problems Son      No Known Problems Son      Diabetes Maternal Aunt China Hiral         Type II       SOCHX:  Social History     Tobacco Use    Smoking status: Some Days     Types: Cigars     Passive exposure: Never    Smokeless tobacco: Never    Tobacco comments:     Sporadic   Substance Use Topics    Alcohol use: Yes     Alcohol/week: 2.0 standard drinks of alcohol     Types: 2 Glasses of wine per week       ALLERGIES:  Patient has no known  "allergies.    CURRENT MEDICATIONS:  Current Outpatient Medications on File Prior to Visit   Medication Sig Dispense Refill    albuterol (PROVENTIL) 2.5 mg /3 mL (0.083 %) nebulizer solution Take 3 mLs (2.5 mg total) by nebulization every 6 (six) hours as needed for Wheezing. Rescue 90 mL 1    albuterol sulfate (PROAIR RESPICLICK) 90 mcg/actuation inhaler Inhale 1 puff into the lungs every 4 (four) hours as needed for Wheezing. Rescue 1 each 3    blood sugar diagnostic (CONTOUR TEST STRIPS) Strp 1 strip by Misc.(Non-Drug; Combo Route) route once daily. 100 each 3    budesonide-formoterol 160-4.5 mcg (SYMBICORT) 160-4.5 mcg/actuation HFAA INHALE 2 PUFFS BY MOUTH TWICE DAILY 10.2 g 2    cefUROXime (CEFTIN) 500 MG tablet Take 1 tablet (500 mg total) by mouth 2 (two) times daily. 20 tablet 0    empagliflozin (JARDIANCE) 25 mg tablet Take 1 tablet (25 mg total) by mouth once daily. 90 tablet 3    fluticasone propionate (FLONASE) 50 mcg/actuation nasal spray 2 sprays (100 mcg total) by Each Nostril route as needed for Rhinitis.      gabapentin (NEURONTIN) 600 MG tablet Take 1 tablet (600 mg total) by mouth nightly. 90 tablet 03    ibuprofen (ADVIL,MOTRIN) 600 MG tablet Take 1 tablet (600 mg total) by mouth every 8 (eight) hours as needed for Pain. 60 tablet 0    insulin glargine U-100, Lantus, (LANTUS SOLOSTAR U-100 INSULIN) 100 unit/mL (3 mL) InPn pen Inject 50 Units into the skin nightly. 45 mL 3    ketoconazole (NIZORAL) 2 % shampoo SMARTSIG:Topical 2-3 Times Weekly      lisinopriL (PRINIVIL,ZESTRIL) 40 MG tablet Take 1 tablet (40 mg total) by mouth once daily. 90 tablet 3    meloxicam (MOBIC) 15 MG tablet Take 1 tablet (15 mg total) by mouth once daily. 30 tablet 1    metFORMIN (GLUCOPHAGE) 1000 MG tablet Take 1 tablet (1,000 mg total) by mouth 2 (two) times daily with meals. 180 tablet 3    pen needle, diabetic (BD ULTRA-FINE JAELYN PEN NEEDLE) 32 gauge x 5/32" Ndle Use as directed with Lantus and sliding scale regular " insulin 100 each 9    rosuvastatin (CRESTOR) 10 MG tablet Take 1 tablet (10 mg total) by mouth once daily. 90 tablet 3    semaglutide (OZEMPIC) 1 mg/dose (4 mg/3 mL) Inject 1 mg into the skin every 7 days. 3 mL 2    zolpidem (AMBIEN) 10 mg Tab Take 1 tablet (10 mg total) by mouth nightly as needed (insomnia). 30 tablet 3     No current facility-administered medications on file prior to visit.       REVIEW OF SYSTEMS:  ROS    GENERAL PHYSICAL EXAM:   There were no vitals taken for this visit.   GEN: well developed, well nourished, no acute distress   HENT: Normocephalic, atraumatic   EYES: No discharge, conjunctiva normal   NECK: Supple, non-tender   PULM: No wheezing, no respiratory distress   CV: RRR   ABD: Soft, non-tender    ORTHO EXAM:   Examination of bilateral hands and wrist reveals that there is no edema.  There was no muscular atrophy.  Palpation produces no specific tenderness.  On his left side he has mild decreased sensation in the median distribution with intact ulnar and radial sensation.  On the right he has intact sensation throughout.  On the left he has a positive Tinel's and a positive Durkan's.  On the right he has mildly positive Tinel's but a negative Durkan's.  Has 5/5 thenar muscular strength.  He has 5/5 finger flexion.  He has 5/5 wrist extension and flexion.  He does have a 2+ radial pulse bilaterally    RADIOLOGY:   EMG nerve conduction study has been reviewed.  It is consistent with right C6/7 cervical radiculopathy.  There is also moderate bilateral carpal tunnel syndrome    ASSESSMENT:   Bilateral carpal tunnel syndrome    PLAN:  1. After consent was obtained injection was placed to the right and left carpal tunnels.  We will use these both diagnostically and therapeutically.      2.  Will follow up in 6-8 weeks for repeat evaluation

## 2025-02-03 NOTE — PROCEDURES
Carpal Tunnel: L carpal tunnel    Date/Time: 2/3/2025 8:40 AM    Performed by: Fercho Chambers MD  Authorized by: Fercho Chambers MD    Consent Done?:  Yes (Verbal)  Indications:  Pain  Site marked: the procedure site was marked    Timeout: prior to procedure the correct patient, procedure, and site was verified    Prep: patient was prepped and draped in usual sterile fashion      Local anesthesia used?: Yes    Local anesthetic:  Lidocaine 1% without epinephrine  Anesthetic total (ml):  0.5    Location:  Wrist (Left carpal tunnel)  Site:  L carpal tunnel  Needle size:  25 G  Medications:  40 mg triamcinolone acetonide 40 mg/mL (20 mg injected)  Patient tolerance:  Patient tolerated the procedure well with no immediate complications

## 2025-02-03 NOTE — PROCEDURES
Carpal Tunnel: R carpal tunnel    Date/Time: 2/3/2025 8:40 AM    Performed by: Fercho Chambers MD  Authorized by: Fercho Chambers MD    Consent Done?:  Yes (Verbal)  Indications:  Pain  Site marked: the procedure site was marked    Timeout: prior to procedure the correct patient, procedure, and site was verified    Prep: patient was prepped and draped in usual sterile fashion      Local anesthesia used?: Yes    Local anesthetic:  Lidocaine 1% without epinephrine  Anesthetic total (ml):  0.5    Location:  Wrist (Right carpal tunnel)  Site:  R carpal tunnel  Needle size:  25 G  Medications:  40 mg triamcinolone acetonide 40 mg/mL (20 mg injected)  Patient tolerance:  Patient tolerated the procedure well with no immediate complications

## 2025-02-16 DIAGNOSIS — M54.12 CERVICAL RADICULOPATHY: ICD-10-CM

## 2025-02-17 RX ORDER — GABAPENTIN 600 MG/1
600 TABLET ORAL NIGHTLY
Qty: 90 TABLET | Refills: 3 | Status: SHIPPED | OUTPATIENT
Start: 2025-02-17 | End: 2025-02-21 | Stop reason: SDUPTHER

## 2025-02-21 ENCOUNTER — PATIENT MESSAGE (OUTPATIENT)
Dept: PAIN MEDICINE | Facility: CLINIC | Age: 70
End: 2025-02-21
Payer: COMMERCIAL

## 2025-02-21 DIAGNOSIS — M54.12 CERVICAL RADICULOPATHY: ICD-10-CM

## 2025-02-25 NOTE — TELEPHONE ENCOUNTER
I sent him a new prescription the other day, it had new directions, and frequency.  Is the most recent prescription that I prescribed the 1 they will not fill until March?

## 2025-02-25 NOTE — TELEPHONE ENCOUNTER
Spoke with pharmacy and they said they wouldn't fill it because he got gabapentin on 1/15 and it was a 90 day supply. He was taking it three times a day due to increased pain.

## 2025-02-26 RX ORDER — GABAPENTIN 600 MG/1
600 TABLET ORAL 2 TIMES DAILY
Qty: 180 TABLET | Refills: 0 | Status: SHIPPED | OUTPATIENT
Start: 2025-02-26 | End: 2025-05-27

## 2025-03-31 ENCOUNTER — HOSPITAL ENCOUNTER (OUTPATIENT)
Dept: RADIOLOGY | Facility: HOSPITAL | Age: 70
Discharge: HOME | End: 2025-03-31
Attending: ORTHOPAEDIC SURGERY
Payer: COMMERCIAL

## 2025-03-31 ENCOUNTER — OFFICE VISIT (OUTPATIENT)
Dept: ORTHOPEDICS | Facility: CLINIC | Age: 70
End: 2025-03-31
Payer: COMMERCIAL

## 2025-03-31 VITALS — HEIGHT: 74 IN | WEIGHT: 245.13 LBS | BODY MASS INDEX: 31.46 KG/M2

## 2025-03-31 DIAGNOSIS — M79.642 BILATERAL HAND PAIN: ICD-10-CM

## 2025-03-31 DIAGNOSIS — M79.642 BILATERAL HAND PAIN: Primary | ICD-10-CM

## 2025-03-31 DIAGNOSIS — M79.641 BILATERAL HAND PAIN: ICD-10-CM

## 2025-03-31 DIAGNOSIS — M79.641 BILATERAL HAND PAIN: Primary | ICD-10-CM

## 2025-03-31 PROCEDURE — 99999 PR PBB SHADOW E&M-EST. PATIENT-LVL III: CPT | Mod: PBBFAC,,, | Performed by: ORTHOPAEDIC SURGERY

## 2025-03-31 PROCEDURE — 1125F AMNT PAIN NOTED PAIN PRSNT: CPT | Mod: CPTII,S$GLB,, | Performed by: ORTHOPAEDIC SURGERY

## 2025-03-31 PROCEDURE — 1159F MED LIST DOCD IN RCRD: CPT | Mod: CPTII,S$GLB,, | Performed by: ORTHOPAEDIC SURGERY

## 2025-03-31 PROCEDURE — 73130 X-RAY EXAM OF HAND: CPT | Mod: TC,50,PO

## 2025-03-31 PROCEDURE — 3008F BODY MASS INDEX DOCD: CPT | Mod: CPTII,S$GLB,, | Performed by: ORTHOPAEDIC SURGERY

## 2025-03-31 PROCEDURE — 99213 OFFICE O/P EST LOW 20 MIN: CPT | Mod: S$GLB,,, | Performed by: ORTHOPAEDIC SURGERY

## 2025-03-31 PROCEDURE — 1101F PT FALLS ASSESS-DOCD LE1/YR: CPT | Mod: CPTII,S$GLB,, | Performed by: ORTHOPAEDIC SURGERY

## 2025-03-31 PROCEDURE — 4010F ACE/ARB THERAPY RXD/TAKEN: CPT | Mod: CPTII,S$GLB,, | Performed by: ORTHOPAEDIC SURGERY

## 2025-03-31 PROCEDURE — 3288F FALL RISK ASSESSMENT DOCD: CPT | Mod: CPTII,S$GLB,, | Performed by: ORTHOPAEDIC SURGERY

## 2025-03-31 PROCEDURE — 73130 X-RAY EXAM OF HAND: CPT | Mod: 26,,, | Performed by: RADIOLOGY

## 2025-03-31 NOTE — PROGRESS NOTES
Mr Alan returns to clinic today.  Has a history of bilateral hand numbness pain and some stiffness.  We did inject him for carpal tunnel syndrome at his last visit.  States that he had about 2 weeks' worth of relief with those injections.  He is still having significant stiffness but his sensation is intact now     Physical exam: Examination of bilateral hands and wrist reveals that there is no edema.  There are no major skin changes.  Palpation about the hand produces mild global tenderness.  He is able to flex and extend the fingers but there is stiffness noted.  He does report intact sensation throughout the median distributions.  Tinel's is negative today.  He has no areas of specific triggering noted     Radiology: X-rays of bilateral hands were taken in clinic today there was noted to be degenerative changes throughout both of the hands.  There are multiple areas of arthritis noted.  There were no other major changes     Assessment: Bilateral carpal tunnel syndrome and bilateral hand arthritis     Plan:     1. We will continue to monitor the carpal tunnel as the numbness is better    2.  He will begin taking his Mobic.  He will take that more consistently on a daily basis.  He will move to only intermittent use of the ibuprofen     3. He will follow up with me as needed

## 2025-04-05 DIAGNOSIS — M54.12 CERVICAL RADICULOPATHY: ICD-10-CM

## 2025-04-07 RX ORDER — GABAPENTIN 600 MG/1
600 TABLET ORAL NIGHTLY
Qty: 90 TABLET | Refills: 3 | Status: SHIPPED | OUTPATIENT
Start: 2025-04-07

## 2025-06-26 DIAGNOSIS — M54.12 CERVICAL RADICULOPATHY: ICD-10-CM

## 2025-06-26 RX ORDER — GABAPENTIN 600 MG/1
600 TABLET ORAL NIGHTLY
Qty: 90 TABLET | Refills: 3 | Status: SHIPPED | OUTPATIENT
Start: 2025-06-26 | End: 2025-06-27 | Stop reason: SDUPTHER

## 2025-06-27 DIAGNOSIS — M54.12 CERVICAL RADICULOPATHY: ICD-10-CM

## 2025-06-27 RX ORDER — GABAPENTIN 600 MG/1
600 TABLET ORAL 2 TIMES DAILY
Qty: 60 TABLET | Refills: 3 | Status: SHIPPED | OUTPATIENT
Start: 2025-06-27

## (undated) DEVICE — TRAY NERVE BLOCK

## (undated) DEVICE — GLOVE 7.5 PROTEXIS PI MICRO

## (undated) DEVICE — APPLICATOR CHLORAPREP CLR 10.5

## (undated) DEVICE — HANDLE SURG LIGHT NONRIGID

## (undated) DEVICE — SYR GLASS 5CC LUER LOK

## (undated) DEVICE — SOL SOD CHLORIDE 0.9% 10ML